# Patient Record
Sex: MALE | Race: WHITE | Employment: OTHER | URBAN - METROPOLITAN AREA
[De-identification: names, ages, dates, MRNs, and addresses within clinical notes are randomized per-mention and may not be internally consistent; named-entity substitution may affect disease eponyms.]

---

## 2017-11-01 ENCOUNTER — HOSPITAL ENCOUNTER (OUTPATIENT)
Dept: OTHER | Age: 77
Discharge: HOME | End: 2017-11-30
Attending: INTERNAL MEDICINE | Admitting: INTERNAL MEDICINE

## 2017-12-01 ENCOUNTER — HOSPITAL ENCOUNTER (OUTPATIENT)
Dept: OTHER | Age: 77
Discharge: HOME | End: 2017-12-31
Attending: INTERNAL MEDICINE | Admitting: INTERNAL MEDICINE

## 2018-01-01 ENCOUNTER — HOSPITAL ENCOUNTER (OUTPATIENT)
Dept: OTHER | Age: 78
Discharge: HOME | End: 2018-01-31
Attending: INTERNAL MEDICINE | Admitting: INTERNAL MEDICINE

## 2018-02-01 ENCOUNTER — HOSPITAL ENCOUNTER (OUTPATIENT)
Dept: OTHER | Age: 78
Discharge: HOME | End: 2018-02-28
Attending: INTERNAL MEDICINE | Admitting: INTERNAL MEDICINE

## 2018-03-01 ENCOUNTER — HOSPITAL ENCOUNTER (OUTPATIENT)
Dept: OTHER | Age: 78
Discharge: HOME | End: 2018-03-31
Attending: INTERNAL MEDICINE | Admitting: INTERNAL MEDICINE

## 2018-04-01 ENCOUNTER — HOSPITAL ENCOUNTER (OUTPATIENT)
Dept: OTHER | Age: 78
Discharge: HOME | End: 2018-04-30
Attending: INTERNAL MEDICINE | Admitting: INTERNAL MEDICINE

## 2018-05-01 ENCOUNTER — HOSPITAL ENCOUNTER (OUTPATIENT)
Dept: OTHER | Age: 78
Discharge: HOME | End: 2018-05-31
Attending: INTERNAL MEDICINE | Admitting: INTERNAL MEDICINE

## 2021-10-26 ENCOUNTER — HOSPITAL ENCOUNTER (OUTPATIENT)
Age: 81
Setting detail: OBSERVATION
Discharge: HOME | End: 2021-10-28
Attending: EMERGENCY MEDICINE | Admitting: INTERNAL MEDICINE
Payer: COMMERCIAL

## 2021-10-26 DIAGNOSIS — R26.2 AMBULATORY DYSFUNCTION: Primary | ICD-10-CM

## 2021-10-26 DIAGNOSIS — D72.829 LEUKOCYTOSIS, UNSPECIFIED TYPE: ICD-10-CM

## 2021-10-26 DIAGNOSIS — M25.561 ACUTE PAIN OF BOTH KNEES: ICD-10-CM

## 2021-10-26 DIAGNOSIS — M25.562 ACUTE PAIN OF BOTH KNEES: ICD-10-CM

## 2021-10-26 DIAGNOSIS — R53.1 GENERALIZED WEAKNESS: ICD-10-CM

## 2021-10-26 DIAGNOSIS — L89.302 PRESSURE INJURY OF BUTTOCK, STAGE 2, UNSPECIFIED LATERALITY (HCC): ICD-10-CM

## 2021-10-26 PROCEDURE — 96367 TX/PROPH/DG ADDL SEQ IV INF: CPT

## 2021-10-26 PROCEDURE — P9612 CATHETERIZE FOR URINE SPEC: HCPCS

## 2021-10-26 PROCEDURE — 96365 THER/PROPH/DIAG IV INF INIT: CPT

## 2021-10-26 PROCEDURE — 99285 EMERGENCY DEPT VISIT HI MDM: CPT

## 2021-10-26 RX ORDER — OXYCODONE HYDROCHLORIDE AND ACETAMINOPHEN 5; 325 MG/1; MG/1
1 TABLET ORAL ONCE
Status: COMPLETED | OUTPATIENT
Start: 2021-10-27 | End: 2021-10-27

## 2021-10-26 RX ORDER — METHYLPREDNISOLONE 4 MG/1
TABLET ORAL
COMMUNITY
Start: 2021-10-22 | End: 2021-10-26

## 2021-10-26 RX ORDER — METHYLPREDNISOLONE 4 MG/1
TABLET ORAL
Status: ON HOLD | COMMUNITY
Start: 2021-03-11 | End: 2021-10-28 | Stop reason: HOSPADM

## 2021-10-26 RX ORDER — OFLOXACIN 3 MG/ML
SOLUTION/ DROPS OPHTHALMIC
Status: ON HOLD | COMMUNITY
Start: 2021-08-11 | End: 2021-10-27

## 2021-10-26 RX ORDER — PREDNISOLONE ACETATE 10 MG/ML
SUSPENSION/ DROPS OPHTHALMIC
Status: ON HOLD | COMMUNITY
Start: 2021-09-07 | End: 2021-10-27

## 2021-10-26 RX ORDER — FUROSEMIDE 40 MG/1
40 TABLET ORAL DAILY
Status: ON HOLD | COMMUNITY
Start: 2021-01-06 | End: 2021-10-28 | Stop reason: SDUPTHER

## 2021-10-26 ASSESSMENT — PAIN DESCRIPTION - ORIENTATION: ORIENTATION: RIGHT;LEFT

## 2021-10-26 ASSESSMENT — PAIN SCALES - GENERAL: PAINLEVEL_OUTOF10: 3

## 2021-10-26 ASSESSMENT — PAIN DESCRIPTION - PAIN TYPE: TYPE: ACUTE PAIN

## 2021-10-26 ASSESSMENT — PAIN DESCRIPTION - FREQUENCY: FREQUENCY: CONTINUOUS

## 2021-10-26 ASSESSMENT — PAIN DESCRIPTION - DESCRIPTORS: DESCRIPTORS: ACHING;SHARP

## 2021-10-26 ASSESSMENT — PAIN DESCRIPTION - LOCATION: LOCATION: FOOT

## 2021-10-27 ENCOUNTER — APPOINTMENT (OUTPATIENT)
Dept: GENERAL RADIOLOGY | Age: 81
End: 2021-10-27
Payer: COMMERCIAL

## 2021-10-27 ENCOUNTER — APPOINTMENT (OUTPATIENT)
Dept: CT IMAGING | Age: 81
End: 2021-10-27
Payer: COMMERCIAL

## 2021-10-27 PROBLEM — R26.2 INABILITY TO AMBULATE DUE TO KNEE: Status: ACTIVE | Noted: 2021-10-27

## 2021-10-27 LAB
A/G RATIO: 1 (ref 1.1–2.2)
ALBUMIN SERPL-MCNC: 3.8 G/DL (ref 3.4–5)
ALP BLD-CCNC: 84 U/L (ref 40–129)
ALT SERPL-CCNC: 18 U/L (ref 10–40)
AMORPHOUS: ABNORMAL /HPF
ANION GAP SERPL CALCULATED.3IONS-SCNC: 12 MMOL/L (ref 3–16)
AST SERPL-CCNC: 47 U/L (ref 15–37)
BACTERIA: ABNORMAL /HPF
BASOPHILS ABSOLUTE: 0 K/UL (ref 0–0.2)
BASOPHILS RELATIVE PERCENT: 0.1 %
BILIRUB SERPL-MCNC: 1.6 MG/DL (ref 0–1)
BILIRUBIN URINE: ABNORMAL
BLOOD, URINE: ABNORMAL
BUN BLDV-MCNC: 20 MG/DL (ref 7–20)
CALCIUM SERPL-MCNC: 9.6 MG/DL (ref 8.3–10.6)
CHLORIDE BLD-SCNC: 99 MMOL/L (ref 99–110)
CLARITY: CLEAR
CO2: 24 MMOL/L (ref 21–32)
COLOR: YELLOW
CREAT SERPL-MCNC: 0.8 MG/DL (ref 0.8–1.3)
EKG ATRIAL RATE: 86 BPM
EKG DIAGNOSIS: NORMAL
EKG P AXIS: 79 DEGREES
EKG P-R INTERVAL: 178 MS
EKG Q-T INTERVAL: 380 MS
EKG QRS DURATION: 104 MS
EKG QTC CALCULATION (BAZETT): 454 MS
EKG R AXIS: 45 DEGREES
EKG T AXIS: 71 DEGREES
EKG VENTRICULAR RATE: 86 BPM
EOSINOPHILS ABSOLUTE: 0 K/UL (ref 0–0.6)
EOSINOPHILS RELATIVE PERCENT: 0.1 %
GFR AFRICAN AMERICAN: >60
GFR NON-AFRICAN AMERICAN: >60
GLOBULIN: 3.7 G/DL
GLUCOSE BLD-MCNC: 200 MG/DL (ref 70–99)
GLUCOSE URINE: NEGATIVE MG/DL
HCT VFR BLD CALC: 39.5 % (ref 40.5–52.5)
HEMOGLOBIN: 13.3 G/DL (ref 13.5–17.5)
KETONES, URINE: ABNORMAL MG/DL
LEUKOCYTE ESTERASE, URINE: NEGATIVE
LYMPHOCYTES ABSOLUTE: 0.3 K/UL (ref 1–5.1)
LYMPHOCYTES RELATIVE PERCENT: 1.9 %
MCH RBC QN AUTO: 34.4 PG (ref 26–34)
MCHC RBC AUTO-ENTMCNC: 33.6 G/DL (ref 31–36)
MCV RBC AUTO: 102.3 FL (ref 80–100)
MICROSCOPIC EXAMINATION: YES
MONOCYTES ABSOLUTE: 1.1 K/UL (ref 0–1.3)
MONOCYTES RELATIVE PERCENT: 6.7 %
NEUTROPHILS ABSOLUTE: 15.2 K/UL (ref 1.7–7.7)
NEUTROPHILS RELATIVE PERCENT: 91.2 %
NITRITE, URINE: NEGATIVE
PDW BLD-RTO: 13.8 % (ref 12.4–15.4)
PH UA: 6 (ref 5–8)
PLATELET # BLD: 159 K/UL (ref 135–450)
PMV BLD AUTO: 8.2 FL (ref 5–10.5)
POTASSIUM REFLEX MAGNESIUM: 4.2 MMOL/L (ref 3.5–5.1)
PRO-BNP: 6104 PG/ML (ref 0–449)
PROCALCITONIN: 0.86 NG/ML (ref 0–0.15)
PROTEIN UA: 100 MG/DL
RBC # BLD: 3.86 M/UL (ref 4.2–5.9)
RBC UA: ABNORMAL /HPF (ref 0–4)
SARS-COV-2, NAAT: NOT DETECTED
SEDIMENTATION RATE, ERYTHROCYTE: 95 MM/HR (ref 0–20)
SODIUM BLD-SCNC: 135 MMOL/L (ref 136–145)
SPECIFIC GRAVITY UA: 1.02 (ref 1–1.03)
TOTAL PROTEIN: 7.5 G/DL (ref 6.4–8.2)
URIC ACID, SERUM: 8.3 MG/DL (ref 3.5–7.2)
URINE REFLEX TO CULTURE: ABNORMAL
URINE TYPE: ABNORMAL
UROBILINOGEN, URINE: 2 E.U./DL
WBC # BLD: 16.7 K/UL (ref 4–11)
WBC UA: ABNORMAL /HPF (ref 0–5)

## 2021-10-27 PROCEDURE — 84550 ASSAY OF BLOOD/URIC ACID: CPT

## 2021-10-27 PROCEDURE — 99221 1ST HOSP IP/OBS SF/LOW 40: CPT | Performed by: NURSE PRACTITIONER

## 2021-10-27 PROCEDURE — 73110 X-RAY EXAM OF WRIST: CPT

## 2021-10-27 PROCEDURE — 73502 X-RAY EXAM HIP UNI 2-3 VIEWS: CPT

## 2021-10-27 PROCEDURE — 73560 X-RAY EXAM OF KNEE 1 OR 2: CPT

## 2021-10-27 PROCEDURE — 2580000003 HC RX 258: Performed by: INTERNAL MEDICINE

## 2021-10-27 PROCEDURE — 1200000000 HC SEMI PRIVATE

## 2021-10-27 PROCEDURE — 71045 X-RAY EXAM CHEST 1 VIEW: CPT

## 2021-10-27 PROCEDURE — 70450 CT HEAD/BRAIN W/O DYE: CPT

## 2021-10-27 PROCEDURE — 6370000000 HC RX 637 (ALT 250 FOR IP): Performed by: INTERNAL MEDICINE

## 2021-10-27 PROCEDURE — 87635 SARS-COV-2 COVID-19 AMP PRB: CPT

## 2021-10-27 PROCEDURE — 96367 TX/PROPH/DG ADDL SEQ IV INF: CPT

## 2021-10-27 PROCEDURE — 87040 BLOOD CULTURE FOR BACTERIA: CPT

## 2021-10-27 PROCEDURE — 81001 URINALYSIS AUTO W/SCOPE: CPT

## 2021-10-27 PROCEDURE — 73080 X-RAY EXAM OF ELBOW: CPT

## 2021-10-27 PROCEDURE — 73630 X-RAY EXAM OF FOOT: CPT

## 2021-10-27 PROCEDURE — 93005 ELECTROCARDIOGRAM TRACING: CPT | Performed by: EMERGENCY MEDICINE

## 2021-10-27 PROCEDURE — 85652 RBC SED RATE AUTOMATED: CPT

## 2021-10-27 PROCEDURE — G0378 HOSPITAL OBSERVATION PER HR: HCPCS

## 2021-10-27 PROCEDURE — 6360000002 HC RX W HCPCS: Performed by: INTERNAL MEDICINE

## 2021-10-27 PROCEDURE — 72131 CT LUMBAR SPINE W/O DYE: CPT

## 2021-10-27 PROCEDURE — 83880 ASSAY OF NATRIURETIC PEPTIDE: CPT

## 2021-10-27 PROCEDURE — 6370000000 HC RX 637 (ALT 250 FOR IP): Performed by: EMERGENCY MEDICINE

## 2021-10-27 PROCEDURE — 93010 ELECTROCARDIOGRAM REPORT: CPT | Performed by: INTERNAL MEDICINE

## 2021-10-27 PROCEDURE — 73610 X-RAY EXAM OF ANKLE: CPT

## 2021-10-27 PROCEDURE — 36415 COLL VENOUS BLD VENIPUNCTURE: CPT

## 2021-10-27 PROCEDURE — 80053 COMPREHEN METABOLIC PANEL: CPT

## 2021-10-27 PROCEDURE — 84145 PROCALCITONIN (PCT): CPT

## 2021-10-27 PROCEDURE — 85025 COMPLETE CBC W/AUTO DIFF WBC: CPT

## 2021-10-27 PROCEDURE — 6370000000 HC RX 637 (ALT 250 FOR IP): Performed by: NURSE PRACTITIONER

## 2021-10-27 PROCEDURE — 96365 THER/PROPH/DIAG IV INF INIT: CPT

## 2021-10-27 RX ORDER — ACETAMINOPHEN 325 MG/1
650 TABLET ORAL EVERY 6 HOURS PRN
Status: DISCONTINUED | OUTPATIENT
Start: 2021-10-27 | End: 2021-10-28 | Stop reason: HOSPADM

## 2021-10-27 RX ORDER — OXYCODONE HYDROCHLORIDE AND ACETAMINOPHEN 5; 325 MG/1; MG/1
1 TABLET ORAL EVERY 6 HOURS PRN
Status: DISCONTINUED | OUTPATIENT
Start: 2021-10-27 | End: 2021-10-28

## 2021-10-27 RX ORDER — FUROSEMIDE 40 MG/1
40 TABLET ORAL DAILY
Status: DISCONTINUED | OUTPATIENT
Start: 2021-10-27 | End: 2021-10-28 | Stop reason: HOSPADM

## 2021-10-27 RX ORDER — COLCHICINE 0.6 MG/1
0.6 CAPSULE ORAL 2 TIMES DAILY
Status: DISCONTINUED | OUTPATIENT
Start: 2021-10-27 | End: 2021-10-28 | Stop reason: HOSPADM

## 2021-10-27 RX ORDER — MAGNESIUM SULFATE IN WATER 40 MG/ML
2000 INJECTION, SOLUTION INTRAVENOUS PRN
Status: DISCONTINUED | OUTPATIENT
Start: 2021-10-27 | End: 2021-10-28 | Stop reason: HOSPADM

## 2021-10-27 RX ORDER — POLYETHYLENE GLYCOL 3350 17 G/17G
17 POWDER, FOR SOLUTION ORAL DAILY PRN
Status: DISCONTINUED | OUTPATIENT
Start: 2021-10-27 | End: 2021-10-28 | Stop reason: HOSPADM

## 2021-10-27 RX ORDER — SODIUM CHLORIDE 9 MG/ML
25 INJECTION, SOLUTION INTRAVENOUS PRN
Status: DISCONTINUED | OUTPATIENT
Start: 2021-10-27 | End: 2021-10-28 | Stop reason: HOSPADM

## 2021-10-27 RX ORDER — SODIUM CHLORIDE 9 MG/ML
INJECTION, SOLUTION INTRAVENOUS CONTINUOUS
Status: DISCONTINUED | OUTPATIENT
Start: 2021-10-27 | End: 2021-10-27

## 2021-10-27 RX ORDER — ACETAMINOPHEN 650 MG/1
650 SUPPOSITORY RECTAL EVERY 6 HOURS PRN
Status: DISCONTINUED | OUTPATIENT
Start: 2021-10-27 | End: 2021-10-28 | Stop reason: HOSPADM

## 2021-10-27 RX ORDER — ONDANSETRON 4 MG/1
4 TABLET, ORALLY DISINTEGRATING ORAL EVERY 8 HOURS PRN
Status: DISCONTINUED | OUTPATIENT
Start: 2021-10-27 | End: 2021-10-28 | Stop reason: HOSPADM

## 2021-10-27 RX ORDER — POTASSIUM CHLORIDE 20 MEQ/1
40 TABLET, EXTENDED RELEASE ORAL PRN
Status: DISCONTINUED | OUTPATIENT
Start: 2021-10-27 | End: 2021-10-28 | Stop reason: HOSPADM

## 2021-10-27 RX ORDER — POTASSIUM CHLORIDE 7.45 MG/ML
10 INJECTION INTRAVENOUS PRN
Status: DISCONTINUED | OUTPATIENT
Start: 2021-10-27 | End: 2021-10-28 | Stop reason: HOSPADM

## 2021-10-27 RX ORDER — ONDANSETRON 2 MG/ML
4 INJECTION INTRAMUSCULAR; INTRAVENOUS EVERY 6 HOURS PRN
Status: DISCONTINUED | OUTPATIENT
Start: 2021-10-27 | End: 2021-10-28 | Stop reason: HOSPADM

## 2021-10-27 RX ORDER — SODIUM CHLORIDE 0.9 % (FLUSH) 0.9 %
5-40 SYRINGE (ML) INJECTION PRN
Status: DISCONTINUED | OUTPATIENT
Start: 2021-10-27 | End: 2021-10-28 | Stop reason: HOSPADM

## 2021-10-27 RX ORDER — SODIUM CHLORIDE 0.9 % (FLUSH) 0.9 %
5-40 SYRINGE (ML) INJECTION EVERY 12 HOURS SCHEDULED
Status: DISCONTINUED | OUTPATIENT
Start: 2021-10-27 | End: 2021-10-28 | Stop reason: HOSPADM

## 2021-10-27 RX ORDER — CASTOR OIL AND BALSAM, PERU 788; 87 MG/G; MG/G
OINTMENT TOPICAL 2 TIMES DAILY
Status: DISCONTINUED | OUTPATIENT
Start: 2021-10-27 | End: 2021-10-28 | Stop reason: HOSPADM

## 2021-10-27 RX ADMIN — FUROSEMIDE 40 MG: 40 TABLET ORAL at 07:26

## 2021-10-27 RX ADMIN — DEXTROSE MONOHYDRATE 500 MG: 50 INJECTION, SOLUTION INTRAVENOUS at 05:46

## 2021-10-27 RX ADMIN — SODIUM CHLORIDE, PRESERVATIVE FREE 10 ML: 5 INJECTION INTRAVENOUS at 20:32

## 2021-10-27 RX ADMIN — POLYETHYLENE GLYCOL (3350) 17 G: 17 POWDER, FOR SOLUTION ORAL at 20:31

## 2021-10-27 RX ADMIN — Medication: at 20:32

## 2021-10-27 RX ADMIN — CEFTRIAXONE SODIUM 1000 MG: 1 INJECTION, POWDER, FOR SOLUTION INTRAMUSCULAR; INTRAVENOUS at 05:08

## 2021-10-27 RX ADMIN — COLCHICINE 0.6 MG: 0.6 CAPSULE ORAL at 18:44

## 2021-10-27 RX ADMIN — OXYCODONE HYDROCHLORIDE AND ACETAMINOPHEN 1 TABLET: 5; 325 TABLET ORAL at 20:31

## 2021-10-27 RX ADMIN — SODIUM CHLORIDE: 9 INJECTION, SOLUTION INTRAVENOUS at 05:06

## 2021-10-27 RX ADMIN — SODIUM CHLORIDE, PRESERVATIVE FREE 10 ML: 5 INJECTION INTRAVENOUS at 07:28

## 2021-10-27 RX ADMIN — OXYCODONE HYDROCHLORIDE AND ACETAMINOPHEN 1 TABLET: 5; 325 TABLET ORAL at 00:48

## 2021-10-27 ASSESSMENT — PAIN SCALES - GENERAL
PAINLEVEL_OUTOF10: 0
PAINLEVEL_OUTOF10: 4
PAINLEVEL_OUTOF10: 2
PAINLEVEL_OUTOF10: 6
PAINLEVEL_OUTOF10: 4
PAINLEVEL_OUTOF10: 4
PAINLEVEL_OUTOF10: 0

## 2021-10-27 NOTE — PROGRESS NOTES
4 Eyes Skin Assessment     The patient is being assess for   Admission    I agree that 2 RN's have performed a thorough Head to Toe Skin Assessment on the patient. ALL assessment sites listed below have been assessed. Areas assessed for pressure by both nurses:   [x]   Head, Face, and Ears   [x]   Shoulders, Back, and Chest, Abdomen  [x]   Arms, Elbows, and Hands   [x]   Coccyx, Sacrum, and Ischium  [x]   Legs, Feet, and Heels        · Scabbing to head  · DTI to buttocks   · Bruising R elbow area  · Scattered bruises  · SMALL Pimple under L breast    Skin Assessed Under all Medical Devices by both nurses:  NA              All Mepilex Borders were peeled back and area peeked at by both nurses:  No: NO  Please list where Mepilex Borders are located: NA             **SHARE this note so that the co-signing nurse is able to place an eSignature**    Co-signer eSignature: Electronically signed by Dioni Anderson RN on 10/27/21 at 4:22 PM EDT    Does the Patient have Skin Breakdown related to pressure?   Yes [facility] WOUND CARE was Initiated documentation include the Sivan-wound, Wound Assessment, Measurements, Dressing Treatment, Drainage, and Color\",              Ton Prevention initiated:  Yes   Wound Care Orders initiated:  Yes      WOC nurse consulted for Pressure Injury (Stage 3,4, Unstageable, DTI, NWPT, Complex wounds)and New or Established Ostomies:  Yes    [address] RN     Primary Nurse eSignature: Electronically signed by Army Kisha RN on 10/27/21 at 6:34 PM EDT

## 2021-10-27 NOTE — ED PROVIDER NOTES
[address] ED      CHIEF COMPLAINT  Knee Pain (Pt states he had a gout flair up and fell friday; c/o pain bilateral knee to ankle pain. 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 male with a past medical history of CHF and gout who presents to the ED complaining of pain secondary to a fall. The patient states that he fell this past Friday. He states that he was attempting to take his pants off, when he tripped on his pants and fell onto his right side. He denies hitting his head or losing consciousness and he is not on blood thinners. He states that he was able to walk initially on Saturday, however had worsening pain over the past 3 days and has been sitting in a chair and unable to walk since Sunday. He states that he has pain primarily to his right arm, as well as both of his knees. He denies any chest pain or abdominal pain. Denies any vomiting or shortness of breath. Denies any dizziness but states he is unable to walk due to pain. He takes prednisone for gout and took 10 mg of prednisone at home he also took ibuprofen earlier today. Collateral history from the patient's daughter is that he apparently has not really been eating or drinking the past several days. He has not been walking due to pain, and has been intermittently confused. He also has not taken his Lasix for the past 5 days. Apparently the patient had a \"fever\" for EMS. No other complaints, modifying factors or associated symptoms. I have reviewed the following from the nursing documentation. History reviewed. No pertinent past medical history. History reviewed. No pertinent surgical history. History reviewed. No pertinent family history.   Social History     Socioeconomic History    Marital status:      Spouse name: Not on file    Number of children: Not on file    Years of education: Not on file    Highest education level: Not on file   Occupational History    Not on file   Tobacco Use    Smoking status: Never Smoker    Smokeless tobacco: Never Used   Substance and Sexual Activity    Alcohol use: Never    Drug use: Never    Sexual activity: Not on file   Other Topics Concern    Not on file   Social History Narrative    Not on file     Social Determinants of Health     Financial Resource Strain:     Difficulty of Paying Living Expenses:    Food Insecurity:     Worried About Running Out of Food in the Last Year:     [address] in the Last Year:    Transportation Needs:     Lack of Transportation (Medical):      Lack of Transportation (Non-Medical):    Physical Activity:     Days of Exercise per Week:     Minutes of Exercise per Session:    Stress:     Feeling of Stress :    Social Connections:     Frequency of Communication with Friends and Family:     Frequency of Social Gatherings with Friends and Family:     Attends Hoah[employer] Services:     Active Member of Clubs or Organizations:     Attends Club or Organization Meetings:     Marital Status:    Intimate Partner Violence:     Fear of Current or Ex-Partner:     Emotionally Abused:     Physically Abused:     Sexually Abused:      Current Facility-Administered Medications   Medication Dose Route Frequency Provider Last Rate Last Admin    cefTRIAXone (ROCEPHIN) 1000 mg IVPB in 50 mL D5W minibag  1,000 mg IntraVENous Q24H Rishabh Mares MD        azithromycin (ZITHROMAX) 500 mg in D5W 250ml addavial  500 mg IntraVENous Q24H Rishabh Mares MD        furosemide (LASIX) tablet 40 mg  40 mg Oral Daily Risahbh Mares MD        0.9 % sodium chloride infusion   IntraVENous Continuous Rishabh Mares MD        sodium chloride flush 0.9 % injection 5-40 mL  5-40 mL IntraVENous 2 times per day Rishabh Mares MD        sodium chloride flush 0.9 % injection 5-40 mL  5-40 mL IntraVENous PRN Rishabh Mares MD        0.9 % sodium chloride infusion  25 mL IntraVENous PRN Rishabh Mares MD  enoxaparin (LOVENOX) injection 40 mg  40 mg SubCUTAneous Daily Maynor Ramsey MD        ondansetron (ZOFRAN-ODT) disintegrating tablet 4 mg  4 mg Oral Q8H PRN Maynor Ramsey MD        Or    [facility]ron [facility]) injection 4 mg  4 mg IntraVENous Q6H PRN Maynor Ramsey MD        polyethylene glycol [facility]) packet 17 g  17 g Oral Daily PRN Maynor Ramsey MD        acetaminophen (TYLENOL) tablet 650 mg  650 mg Oral Q6H PRN Maynor Ramsey MD        Or    acetaminophen (TYLENOL) suppository 650 mg  650 mg Rectal Q6H PRN Maynor Ramsey MD        potassium chloride (KLOR-CON M) extended release tablet 40 mEq  40 mEq Oral PRN Maynor Ramsey MD        Or    potassium bicarb-citric acid (EFFER-K) effervescent tablet 40 mEq  40 mEq Oral PRN Maynor Ramsey MD        Or    potassium chloride 10 mEq/100 mL IVPB (Peripheral Line)  10 mEq IntraVENous PRN Maynor Ramsey MD        magnesium sulfate 2000 mg in 50 mL IVPB premix  2,000 mg IntraVENous PRN Maynor Ramsey MD         Current Outpatient Medications   Medication Sig Dispense Refill    furosemide (LASIX) 40 MG tablet Take 40 mg by mouth daily      methylPREDNISolone (MEDROL DOSEPACK) 4 MG tablet follow package directions      ofloxacin (OCUFLOX) 0.3 % solution       prednisoLONE acetate (PRED FORTE) 1 % ophthalmic suspension        Allergies   Allergen Reactions    Allopurinol Anaphylaxis, Itching and Rash    Prednisone Anxiety     Became \"wired\" per pt       REVIEW OF SYSTEMS  10 systems reviewed, pertinent positives per HPI otherwise noted to be negative. PHYSICAL EXAM  /65   Pulse 86   Temp 98 °F (36.7 °C) (Oral)   Resp 18   Ht 5' 10\" (1.778 m)   Wt 180 lb (81.6 kg)   SpO2 99%   BMI 25.83 kg/m²    Physical exam:  General appearance: awake and cooperative. no distress. Non toxic appearing. Skin: Warm and dry. No rashes or lesions. HENT: Normocephalic. Atraumatic. Negative villegas's sign. No raccoon eyes. No nasal septal hematoma. No oropharyngeal bleeding. Mucus membranes are moist. Midface stable. Neck: supple. No C spine tenderness midline. Eyes: YI. EOM intact. Heart: RRR. No murmurs. Lungs: Respirations unlabored. CTAB. No wheezes, rales, or rhonchi. Good air exchange  Abdomen: No tenderness. Soft. Non distended. No peritoneal signs. Musculoskeletal: there is ecchymosis to right lumbar spin and tenderness to palpation midline L spine; no step offs no T spine tenderness midline. No step offs. Right shoulder normal ROM and atraumatic. Right elbow with ecchymosis; flexion/extension intact; ecchymosis to radial aspect right wrist no anatomical snuffbox tenderness; no deformity; median, radial, and ulnar nerves intact motor and sensory. cardinal motions of hand intact. MCP, PIP, and DIP intact with flexion and extension 5/5 strength. Cap refill <2 seconds. Radial and ulnar pulses +2/4 bilaterally. tenderness to palpation right hip without deformity; quadriceps tendons intact; contusions to bilateral knees with flexion/extension intact but limited ROM due to pain; tenderness to lateral aspect right ankle;  decreased ROM bilateral ankles due to pain; achilles tendon intact; no tenderness to base of 5th metatarsal; cap refill <2 seconds; toe flexion/extension 5/5 strength; toe flexion and extension intact all toes; no tenderness or ecchymosis to plantar aspect of foot; DP and PT +2/4   Neurological: Alert and oriented. No focal deficits. Sensation intact x 4. No aphasia or dysarthria. Psychiatric: Normal mood and affect. LABS  I have reviewed all labs for this visit.    Results for orders placed or performed during the hospital encounter of 10/26/21   CBC Auto Differential   Result Value Ref Range    WBC 16.7 (H) 4.0 - 11.0 K/uL    RBC 3.86 (L) 4.20 - 5.90 M/uL    Hemoglobin 13.3 (L) 13.5 - 17.5 g/dL    Hematocrit 39.5 (L) 40.5 - 52.5 %    .3 (H) 80.0 - 100.0 fL    MCH 34.4 (H) 26.0 - 34.0 pg    MCHC 33.6 31.0 - 36.0 g/dL    RDW 13.8 12.4 - 15.4 %    Platelets 422 590 - 491 K/uL    MPV 8.2 5.0 - 10.5 fL    Neutrophils % 91.2 %    Lymphocytes % 1.9 %    Monocytes % 6.7 %    Eosinophils % 0.1 %    Basophils % 0.1 %    Neutrophils Absolute 15.2 (H) 1.7 - 7.7 K/uL    Lymphocytes Absolute 0.3 (L) 1.0 - 5.1 K/uL    Monocytes Absolute 1.1 0.0 - 1.3 K/uL    Eosinophils Absolute 0.0 0.0 - 0.6 K/uL    Basophils Absolute 0.0 0.0 - 0.2 K/uL   Comprehensive Metabolic Panel w/ Reflex to MG   Result Value Ref Range    Sodium 135 (L) 136 - 145 mmol/L    Potassium reflex Magnesium 4.2 3.5 - 5.1 mmol/L    Chloride 99 99 - 110 mmol/L    CO2 24 21 - 32 mmol/L    Anion Gap 12 3 - 16    Glucose 200 (H) 70 - 99 mg/dL    BUN 20 7 - 20 mg/dL    CREATININE 0.8 0.8 - 1.3 mg/dL    GFR Non-African American >60 >60    GFR African American >60 >60    Calcium 9.6 8.3 - 10.6 mg/dL    Total Protein 7.5 6.4 - 8.2 g/dL    Albumin 3.8 3.4 - 5.0 g/dL    Albumin/Globulin Ratio 1.0 (L) 1.1 - 2.2    Total Bilirubin 1.6 (H) 0.0 - 1.0 mg/dL    Alkaline Phosphatase 84 40 - 129 U/L    ALT 18 10 - 40 U/L    AST 47 (H) 15 - 37 U/L    Globulin 3.7 Not Established g/dL   Urinalysis Reflex to Culture    Specimen: Urine, clean catch   Result Value Ref Range    Color, UA Yellow Straw/Yellow    Clarity, UA Clear Clear    Glucose, Ur Negative Negative mg/dL    Bilirubin Urine SMALL (A) Negative    Ketones, Urine TRACE (A) Negative mg/dL    Specific Gravity, UA 1.025 1.005 - 1.030    Blood, Urine LARGE (A) Negative    pH, UA 6.0 5.0 - 8.0    Protein,  (A) Negative mg/dL    Urobilinogen, Urine 2.0 (A) <2.0 E.U./dL    Nitrite, Urine Negative Negative    Leukocyte Esterase, Urine Negative Negative    Microscopic Examination YES     Urine Type NotGiven     Urine Reflex to Culture Not Indicated    Brain Natriuretic Peptide   Result Value Ref Range    Pro-BNP 6,104 (H) 0 - 449 pg/mL   Microscopic Urinalysis   Result Value Ref Range    WBC, UA 0-2 0 - 5 /HPF    RBC, UA 0-2 0 - 4 /HPF    Bacteria, UA 1+ (A) None Seen /HPF    Amorphous, UA 1+ /HPF   Uric acid   Result Value Ref Range    Uric Acid, Serum 8.3 (H) 3.5 - 7.2 mg/dL   Procalcitonin   Result Value Ref Range    Procalcitonin 0.86 (H) 0.00 - 0.15 ng/mL       ECG  The Ekg interpreted by me shows  normal sinus rhythm with a rate of 86 with PACs  Axis is   Normal  QTc is  within an acceptable range  Intervals and Durations are unremarkable. ST Segments: nonspecific ST abnormality no acute ischemia   No prior EKG for comparison      RADIOLOGY  XR ELBOW RIGHT (MIN 3 VIEWS)    Result Date: 10/27/2021  EXAMINATION: THREE XRAY VIEWS OF THE RIGHT ELBOW 10/27/2021 12:12 am COMPARISON: None. HISTORY: ORDERING SYSTEM PROVIDED HISTORY: fall with bruising TECHNOLOGIST PROVIDED HISTORY: Reason for exam:->fall with bruising Reason for Exam: fell last week, bruising Acuity: Acute Type of Exam: Initial FINDINGS: Nonstandard views were submitted. A tiny density is seen adjacent to the radial head on 1 of the views. Joint space alignment is grossly normal. Joint effusion cannot be assessed. There is posterior soft tissue swelling. Tiny density adjacent to the radial head is indeterminate. This would be an unusual appearance for a fracture and is probably degenerative. Recommend repeat imaging once the patient is able to properly position his arm for routine views. XR WRIST RIGHT (MIN 3 VIEWS)    Result Date: 10/27/2021  EXAMINATION: 3 XRAY VIEWS OF THE RIGHT WRIST 10/27/2021 12:13 am COMPARISON: None. HISTORY: ORDERING SYSTEM PROVIDED HISTORY: bruising radial aspect TECHNOLOGIST PROVIDED HISTORY: Reason for exam:->bruising radial aspect Reason for Exam: fell last week, bruising Acuity: Acute Type of Exam: Initial FINDINGS: No acute fracture is identified. There is a remote ununited fracture of the ulnar styloid with adjacent osteopenia in the distal ulna.   Joint space alignment is normal.  There is posterior soft tissue swelling. Atherosclerotic calcifications are identified. No acute fracture or malalignment. XR KNEE LEFT (1-2 VIEWS)    Result Date: 10/27/2021  EXAMINATION: 2 XRAY VIEWS OF THE LEFT KNEE 10/27/2021 12:13 am COMPARISON: None. HISTORY: ORDERING SYSTEM PROVIDED HISTORY: fall TECHNOLOGIST PROVIDED HISTORY: Reason for exam:->fall Reason for Exam: fell last week, bruising Acuity: Acute Type of Exam: Initial Pain FINDINGS: There is no fracture or malalignment. Chondrocalcinosis is noted. There is tricompartmental spurring. There is no definite joint effusion though both attempts at the lateral view are limited by the degree of flexion and rotation. Atherosclerotic calcifications are identified. No definite fracture or malalignment. XR KNEE RIGHT (1-2 VIEWS)    Result Date: 10/27/2021  EXAMINATION: 2 XRAY VIEWS OF THE RIGHT KNEE 10/27/2021 12:13 am COMPARISON: None. HISTORY: ORDERING SYSTEM PROVIDED HISTORY: fall TECHNOLOGIST PROVIDED HISTORY: Reason for exam:->fall Reason for Exam: fell last week, bruising Acuity: Acute Type of Exam: Initial Knee pain FINDINGS: There is no fracture or malalignment. Chondrocalcinosis is identified. Knee joint effusion is identified. Atherosclerotic calcifications are noted. No fracture or malalignment. XR ANKLE RIGHT (MIN 3 VIEWS)    Result Date: 10/27/2021  EXAMINATION: THREE XRAY VIEWS OF THE RIGHT ANKLE 10/27/2021 12:12 am COMPARISON: None. HISTORY: ORDERING SYSTEM PROVIDED HISTORY: pain post fall TECHNOLOGIST PROVIDED HISTORY: Reason for exam:->pain post fall Reason for Exam: fell last week, bruising Acuity: Acute Type of Exam: Initial FINDINGS: There is no fracture or malalignment. Achilles tendon enthesophyte and plantar calcaneal spur are noted. There is diffuse soft tissue swelling, especially laterally. Atherosclerotic calcifications are noted. No fracture or malalignment.      XR FOOT LEFT (MIN 3 VIEWS)    Result Date: 10/27/2021  EXAMINATION: THREE XRAY VIEWS OF THE LEFT FOOT 10/27/2021 12:13 am COMPARISON: None. HISTORY: ORDERING SYSTEM PROVIDED HISTORY: fall with pain TECHNOLOGIST PROVIDED HISTORY: Reason for exam:->fall with pain Reason for Exam: fell last week, bruising Acuity: Acute Type of Exam: Initial FINDINGS: Bony density is seen dorsal to the anterior corner of the talus. It is unclear if this represents an avulsion fracture or spurring as there is spurring throughout the intertarsal joints. There is no other evidence to suggest fracture. Joint space alignment is normal.  There is a large plantar calcaneal spur. Achilles tendon enthesophyte is also present. An os peroneum is noted. There is diffuse soft tissue swelling. Atherosclerotic calcifications are noted. The bony density along the anterior talus could signify a mid tarsal injury or may be a degenerative spur. There is no other evidence for fracture. CT Head WO Contrast    Result Date: 10/27/2021  EXAMINATION: CT OF THE HEAD WITHOUT CONTRAST  10/27/2021 12:06 am TECHNIQUE: CT of the head was performed without the administration of intravenous contrast. Dose modulation, iterative reconstruction, and/or weight based adjustment of the mA/kV was utilized to reduce the radiation dose to as low as reasonably achievable. COMPARISON: None. HISTORY: ORDERING SYSTEM PROVIDED HISTORY: confusion TECHNOLOGIST PROVIDED HISTORY: Reason for exam:->confusion Has a \"code stroke\" or \"stroke alert\" been called? ->No Decision Support Exception - unselect if not a suspected or confirmed emergency medical condition->Emergency Medical Condition (MA) Reason for Exam: confusion, fall last week FINDINGS: BRAIN/VENTRICLES: There is no acute intracranial hemorrhage, mass effect or midline shift. No abnormal extra-axial fluid collection. The gray-white differentiation is maintained without evidence of an acute infarct.  Hypoattenuation of the periventricular and subcortical white matter is suggestive of chronic small vessel ischemic disease. Mild diffuse parenchymal volume loss is noted. There is no evidence of hydrocephalus. ORBITS: The visualized portion of the orbits demonstrate no acute abnormality. SINUSES: The visualized paranasal sinuses and mastoid air cells demonstrate no acute abnormality. SOFT TISSUES/SKULL:  No acute abnormality of the visualized skull or soft tissues. No acute intracranial abnormality. MRI may be obtained if clinically indicated     CT LUMBAR SPINE WO CONTRAST    Result Date: 10/27/2021  EXAMINATION: CT OF THE LUMBAR SPINE WITHOUT CONTRAST  10/27/2021 TECHNIQUE: CT of the lumbar spine was performed without the administration of intravenous contrast. Multiplanar reformatted images are provided for review. Adjustment of mA and/or kV according to patient size was utilized. Dose modulation, iterative reconstruction, and/or weight based adjustment of the mA/kV was utilized to reduce the radiation dose to as low as reasonably achievable. COMPARISON: None HISTORY: ORDERING SYSTEM PROVIDED HISTORY: Pain after trauma TECHNOLOGIST PROVIDED HISTORY: Reason for exam:->pain post fall Decision Support Exception - unselect if not a suspected or confirmed emergency medical condition->Emergency Medical Condition (MA) Reason for Exam: Knee Pain (Pt states he had a gout flair up and fell friday; c/o pain bilateral knee to ankle pain. ) Acuity: Acute Type of Exam: Initial FINDINGS: BONES/ALIGNMENT: The bones are osteopenic. No acute fracture or subluxation is seen in the lumbar spine. No endplate erosion is noted. There is no definite lytic or sclerotic lesion. DEGENERATIVE CHANGES: Multilevel lumbar spondylosis is seen, most prominent at L4-L5 where there is moderate to severe right-sided neural foraminal narrowing. MRI may be obtained for detailed evaluation if clinically indicated. SOFT TISSUES/RETROPERITONEUM: Vascular calcification is noted.   Visualized abdominal viscera are unremarkable. No acute fracture or subluxation in the lumbar spine. XR HIP 2-3 VW W PELVIS RIGHT    Result Date: 10/27/2021  EXAMINATION: ONE XRAY VIEW OF THE PELVIS AND TWO XRAY VIEWS RIGHT HIP 10/27/2021 12:12 am COMPARISON: None. HISTORY: ORDERING SYSTEM PROVIDED HISTORY: r hip pain post fall TECHNOLOGIST PROVIDED HISTORY: Reason for exam:->r hip pain post fall Reason for Exam: fell last week, bruising Acuity: Acute Type of Exam: Initial FINDINGS: No fracture is identified. Joint space alignment is normal.  The soft tissues are unremarkable. Partially imaged is lower lumbar spondylosis. No definite fracture or malalignment. ED COURSE/MDM  Patient seen and evaluated. Old records reviewed. Labs and imaging reviewed and results discussed with patient. This is an 52-year-old male presenting with multiple areas of joint pain after he had a fall several days ago. Vital signs are within normal limits. He reportedly was febrile for EMS, is afebrile at 98 here. He is not tachycardic or hypoxic. On exam he is nontoxic-appearing, appears somewhat chronically ill. He describes having fallen several days ago, has not been able to walk for several days. After speaking with his daughter it appears that he is somewhat decompensating at home, has not been eating or drinking, or taking his Lasix over the past several days. He had stage II pressure ulcers related to sitting in his recliner and not moving. His work-up shows a leukocytosis, he takes prednisone chronically for gout. I do not see sign of infection here, blood cultures were sent however held off on antibiotics. The patient has an elevated BNP, questionable pulmonary edema on chest x-ray but again he is not hypoxic. His traumatic imaging is negative. There was report that he was somewhat altered at home intermittently, his CT head is negative.   It is unclear if his joint pain is related to gout or if he just has arthralgias from his fall several days ago. Regardless, he is not able to ambulate. He has pressure ulcers and is deconditioned and requires PT as he is not really functioning at home. The patient was hesitant on being admitted, however after speaking with his daughter and grandson, they came to the decision that he needed to be admitted because they are having trouble caring for him in his current state and do not feel they could transfer him to physical therapy if he were to go home. He is admitted for further treatment I spoke with Dr. Josephine Cruz who accepts.     During the patient's ED course, the patient was given:  Medications   cefTRIAXone (ROCEPHIN) 1000 mg IVPB in 50 mL D5W minibag (has no administration in time range)   azithromycin (ZITHROMAX) 500 mg in D5W 250ml addavial (has no administration in time range)   furosemide (LASIX) tablet 40 mg (has no administration in time range)   0.9 % sodium chloride infusion (has no administration in time range)   sodium chloride flush 0.9 % injection 5-40 mL (has no administration in time range)   sodium chloride flush 0.9 % injection 5-40 mL (has no administration in time range)   0.9 % sodium chloride infusion (has no administration in time range)   enoxaparin (LOVENOX) injection 40 mg (has no administration in time range)   ondansetron (ZOFRAN-ODT) disintegrating tablet 4 mg (has no administration in time range)     Or   ondansetron (ZOFRAN) injection 4 mg (has no administration in time range)   polyethylene glycol (GLYCOLAX) packet 17 g (has no administration in time range)   acetaminophen (TYLENOL) tablet 650 mg (has no administration in time range)     Or   acetaminophen (TYLENOL) suppository 650 mg (has no administration in time range)   potassium chloride (KLOR-CON M) extended release tablet 40 mEq (has no administration in time range)     Or   potassium bicarb-citric acid (EFFER-K) effervescent tablet 40 mEq (has no administration in time range)     Or potassium chloride 10 mEq/100 mL IVPB (Peripheral Line) (has no administration in time range)   magnesium sulfate 2000 mg in 50 mL IVPB premix (has no administration in time range)   oxyCODONE-acetaminophen (PERCOCET) 5-325 MG per tablet 1 tablet (1 tablet Oral Given 10/27/21 0048)        CLINICAL IMPRESSION  1. Ambulatory dysfunction    2. Leukocytosis, unspecified type    3. Acute pain of both knees    4. Pressure injury of buttock, stage 2, unspecified laterality ([facility] [address]    5. Generalized weakness        Blood pressure 109/65, pulse 86, temperature 98 °F (36.7 °C), temperature source Oral, resp. rate 18, height 5' 10\" (1.778 m), weight 180 lb (81.6 kg), SpO2 99 %. Patient was given scripts for the following medications. I counseled patient how to take these medications. New Prescriptions    No medications on file       Follow-up with:  No follow-up provider specified. DISCLAIMER: This chart was created using Dragon dictation software. Efforts were made by me to ensure accuracy, however some errors may be present due to limitations of this technology and occasionally words are not transcribed correctly.      Joe, [address]  10/27/21 2849

## 2021-10-27 NOTE — ED NOTES
Pt to edge of bed, was able to stand with assistance but not able to take a step; pt states he has been sitting in his recliner a lot and has 2 stage 2 pressure ulcers to coccyx; pt was not able to urinate; bladder scan revealed less than 89mls, pt abdomen soft; provider notified      Kel Henley RN  10/27/21 6523

## 2021-10-27 NOTE — ED NOTES
Report given to 2 [facility]. Transport scheduled at this time.       Sanna Nix RN  10/27/21 5437

## 2021-10-27 NOTE — H&P
Hospital Medicine History & Physical      PCP: No primary care provider on file. Date of Admission: 10/26/2021    Date of Service: Pt seen/examined on 10/27/2021     Chief Complaint:    Chief Complaint   Patient presents with    Knee Pain     Pt states he had a gout flair up and fell friday; c/o pain bilateral knee to ankle pain. History Of Present Illness: The patient is a [de-identified] y.o. male with hypertension, gout, CAD, chronic combined CHF/ischemic cardiomyopathy who presents to [facility] with c/o Bilateral lower extremity pain. States he fell on Friday. Since then he has continued to have worsening pain and has been more weak. He has not been able to walk. States he has pain from knees to feet. He reports chills last night and this morning. He has been taking prednisone at home to treat Gout. He denies cough, chest pain, dyspnea, abdominal pain, nausea, vomiting, diarrhea, or dysuria. Vitals stable. Labs with BNP 6,104, procal is 0.86 and WBC 16.7 K. CXR with pulmonary edema. X-ray right wrist negative. X-ray right knee negative. X-ray left foot with bony density along anterior talus, no evidence of fracture. X-ray hip negative. X-ray left knee negative. X-ray right ankle negative. X-ray right elbow with tiny density adjacent to the radial head that is indeterminate. CT lumbar spine negative. CT head negative. Admit to med-surg. Past Medical History:    History reviewed. No pertinent past medical history. Past Surgical History:    History reviewed. No pertinent surgical history. Medications Prior to Admission:    Prior to Admission medications    Medication Sig Start Date End Date Taking?  Authorizing Provider   furosemide (LASIX) 40 MG tablet Take 40 mg by mouth daily 1/6/21  Yes Historical Provider, MD   methylPREDNISolone (MEDROL DOSEPACK) 4 MG tablet follow package directions 3/11/21  Yes Historical Provider, MD   ofloxacin (OCUFLOX) 0.3 % solution  8/11/21 Historical Provider, MD   prednisoLONE acetate (PRED FORTE) 1 % ophthalmic suspension  9/7/21   Historical Provider, MD       Allergies:  Allopurinol and Prednisone    Social History:    TOBACCO:   reports that he has never smoked. He has never used smokeless tobacco.  ETOH:   reports no history of alcohol use. Family History:   Positive as follows:    History reviewed. No pertinent family history. REVIEW OF SYSTEMS:       Constitutional: Negative for fever + chills  Respiratory: Negative  for dyspnea, cough   Cardiovascular: Negative for chest pain   Gastrointestinal: Negative for vomiting, diarrhea   Genitourinary: Negative for hematuria   Musculoskeletal: +  Arthralgias, weakness  Skin: Negative for rash   Neurological: Negative for syncope   Psychiatric/Behavorial: Negative for anxiety    PHYSICAL EXAM:    /69   Pulse 79   Temp 97.7 °F (36.5 °C) (Oral)   Resp 16   Ht 5' 10\" (1.778 m)   Wt 180 lb (81.6 kg)   SpO2 99%   BMI 25.83 kg/m²     Gen: No distress. Alert. Eyes: PERRL. No sclera icterus. No conjunctival injection. ENT: No discharge. Pharynx clear. Neck:  Trachea midline. Resp: No accessory muscle use. No crackles. No wheezes. No rhonchi. CV: Regular rate. Regular rhythm. No murmur. No rub. No edema. GI: Non-tender. Non-distended. Normal bowel sounds. No hernia. Skin: Warm and dry. No nodule on exposed extremities. No rash on exposed extremities. M/S: No cyanosis. No joint deformity. No clubbing. Neuro: Awake. Grossly nonfocal    Psych: Oriented x 3. No anxiety or agitation.      CBC:   Recent Labs     10/27/21  0100   WBC 16.7*   HGB 13.3*   HCT 39.5*   .3*        BMP:   Recent Labs     10/27/21  0100   *   K 4.2   CL 99   CO2 24   BUN 20   CREATININE 0.8     LIVER PROFILE:   Recent Labs     10/27/21  0100   AST 47*   ALT 18   BILITOT 1.6*   ALKPHOS 84     UA:  Recent Labs     10/27/21  0202   COLORU Yellow   PHUR 6.0   WBCUA 0-2   RBCUA 0-2 BACTERIA 1+*   CLARITYU Clear   SPECGRAV 1.025   LEUKOCYTESUR Negative   UROBILINOGEN 2.0*   BILIRUBINUR SMALL*   BLOODU LARGE*   GLUCOSEU Negative   AMORPHOUS 1+          CULTURES  COVID: not detected    EKG:  I have reviewed the EKG with the following interpretation:   Sinus rhythm with Premature atrial complexes. Nonspecific ST abnormality    RADIOLOGY  XR CHEST PORTABLE   Final Result   Cardiomegaly and pulmonary edema. XR HIP 2-3 VW W PELVIS RIGHT   Final Result   No definite fracture or malalignment. XR KNEE LEFT (1-2 VIEWS)   Final Result   No definite fracture or malalignment. XR KNEE RIGHT (1-2 VIEWS)   Final Result   No fracture or malalignment. XR WRIST RIGHT (MIN 3 VIEWS)   Final Result   No acute fracture or malalignment. XR FOOT LEFT (MIN 3 VIEWS)   Final Result   The bony density along the anterior talus could signify a mid tarsal injury   or may be a degenerative spur. There is no other evidence for fracture. XR ANKLE RIGHT (MIN 3 VIEWS)   Final Result   No fracture or malalignment. XR ELBOW RIGHT (MIN 3 VIEWS)   Final Result   Tiny density adjacent to the radial head is indeterminate. This would be an   unusual appearance for a fracture and is probably degenerative. Recommend   repeat imaging once the patient is able to properly position his arm for   routine views. CT LUMBAR SPINE WO CONTRAST   Final Result   No acute fracture or subluxation in the lumbar spine. CT Head WO Contrast   Final Result   No acute intracranial abnormality. MRI may be obtained if clinically   indicated               Active Problems:    Inability to ambulate due to knee  Resolved Problems:    * No resolved hospital problems. *        ASSESSMENT/PLAN:  Gout  - continue Colchicine    Bilateral lower extremity pain  - hold off on steroids, antibiotics  - Percocet as needed.      Fall  - PT/OT    Leukocytosis- possibly related to recent use of prednisone  - monitor CBC off Antibiotics, steroids. Elevated procalcitonin  - unsure. Received Rocephin, Zithromax  - stopped antibiotics. Repeat Procal.     Macrocytosis   - repeat CBC    Large amount blood in urine  - needs to follow with PCP. Hypertension  - BP stable. Ischemic cardiomyopathy  Chronic combined CHF  - stable. No s/s decompensation  - continue Lasix 40 mg daily. -states he takes this as needed at home. F/w Dr. Trini Castro. CAD  - s/p angioplasty  - not on aspirin, statin, or BB  - F/w Dr. Trini Castro. DVT Prophylaxis: Lovenox  Diet: ADULT DIET; Regular  Code Status: Full Code    Plan of care Discussed with Dr. Amado Blanton.      [facility] FNP-C  10/27/2021

## 2021-10-27 NOTE — ED NOTES
Pt sitting up in bed watching t.v. pt is alert and oriented. Breakfast tray ordered for pt. VSS as documented. Pt refused Lovenox injection. Pt reswabbed for covid with rapid swab. Pt denies any needs at this time. Spoke with pt's daughter and update on pt was given. Ara's cell # is [phone]. Other number is [phone].       Eugene Faulkner RN  10/27/21 9135

## 2021-10-27 NOTE — CONSULTS
Pt just admitted from ER to [facility].  Photo obtained from staff RN. Deep tissue injury present. Venelex ointment recommended to promote blood supply to injured tissue. Offload, turn and reposition every 2 hours and prn.     Will follow

## 2021-10-27 NOTE — CARE COORDINATION
Case Management Assessment  Initial Evaluation      Patient Name: Tresa Boas, Dr.  YOB: 1940  Diagnosis: Inability to ambulate due to knee [R26.2]  Date / Time: 10/26/2021 11:07 PM    Admission status/Date: 10/27/2021 Inpatient   Chart Reviewed: Yes      Patient Interviewed: No-pt did not answer bedside phone   Family Interviewed:  Yes - pt's daughter Emily Lyons at [phone]      Hospitalization in the last 30 days:  No    Health Care Decision Maker : Pt's daughter Emily Lyons stated that she doesn't know if pt has a POA or living will but will discuss this with pt to see if he wants to complete forms while in the hospital. Emily Lyons stated pt's wife has dementia and she should be contacted first. She is aware to notify staff if they want to complete POA papers in the hospital as Spiritual care can assist with the forms if he is alert and oriented and stated agreement.     Met with: pt's daughter Wero Chan conducted  (bedside/phone): phone call     Current PCP: Christal Ocampo with [facility] required for SNF : Y          3 night stay required -  N    ADLS  Support Systems/Care Needs:    Transportation: self    Meal Preparation: self, eats out or daughter brings meals over    Housing  Living Arrangements: pt lives at home with his wife who has dementia  Steps: 0  Intent for return to present living arrangements: Yes per daughter  Identified Issues: John Paul Alvarezitlyn  Active with [address] : No Agency:(Services)     Passport/Waiver : No  :                      Phone Number:    Passport/Waiver Services: n/a          Durable Medical Equiptment   DME Provider: n/a  Equipment:   Walker___Cane_x__RTS___ BSC___Shower Chair_x__Hospital Bed___W/C_has access to a wheelchair from his daughter_Other___Rolator; RTS_____  02 at ____Liter(s)---wears(frequency)_______ [facility] ___ CPAP___ BiPap___   N/A____      Home O2 Use :  No    If No for home O2---if presently on O2 during hospitalization:  No  if yes CM to follow for potential DC O2 need  Informed of need for care provider to bring portable home O2 tank on day of discharge for nursing to connect prior to leaving:   Not Indicated  Verbalized agreement/Understanding:   Not Indicated    Community Service Affiliation  Dialysis:  No    · Agency:  · Location:  · Dialysis Schedule:  · Phone:   · Fax: Other Community Services:n/a    DISCHARGE PLAN: Explained Case Management role/services. Chart review completed. Attempted calling pt's bedside phone but it rang with no answer. Called and spoke with pt's daughter Grey Mireles as staff has been talking with her. Ara stated pt is normally independent at home. She stated pt plans on returning home. She stated PT/OT is ordered. Inquired about a SNF and she stated pt wont go to SNF as he won't leave his wife. She stated she works for the MD office at the office. She denied needs or questions for CM. CM will follow. Please notify CM if needs or concerns arise.

## 2021-10-27 NOTE — PROGRESS NOTES
Pharmacy-[facility]  Rx consulted to start [facility] .  Notified MD non formulary status  Martin KEANE 10/27/36204:47 PM  .

## 2021-10-27 NOTE — PLAN OF CARE
[de-identified] yoM who is reportedly under OP Tx for gout flare presents w/ bilat knee/ankle, R arm pain since mechanical fall on Fri. He was unable to bear weight in the ER. No clear Fx on plain films. Family reported to ER that pt has been intermittently confused, poor PO intake and not taking his lasix. He has hx of combined CHF (echo's in CE). CXR is pending.

## 2021-10-27 NOTE — ED NOTES
Pt's daughter at bedside requesting that wound care look at pt's buttocks for further eval, pt's family has been dressing wounds at home.       Anushka Dickens RN  10/27/21 5520

## 2021-10-28 VITALS
DIASTOLIC BLOOD PRESSURE: 67 MMHG | RESPIRATION RATE: 18 BRPM | TEMPERATURE: 97.1 F | BODY MASS INDEX: 29.07 KG/M2 | HEART RATE: 68 BPM | OXYGEN SATURATION: 96 % | HEIGHT: 70 IN | SYSTOLIC BLOOD PRESSURE: 113 MMHG | WEIGHT: 203.06 LBS

## 2021-10-28 PROBLEM — R26.2 INABILITY TO WALK: Status: ACTIVE | Noted: 2021-10-28

## 2021-10-28 LAB
A/G RATIO: 0.9 (ref 1.1–2.2)
ALBUMIN SERPL-MCNC: 3.5 G/DL (ref 3.4–5)
ALP BLD-CCNC: 78 U/L (ref 40–129)
ALT SERPL-CCNC: 22 U/L (ref 10–40)
ANION GAP SERPL CALCULATED.3IONS-SCNC: 16 MMOL/L (ref 3–16)
AST SERPL-CCNC: 51 U/L (ref 15–37)
BASOPHILS ABSOLUTE: 0 K/UL (ref 0–0.2)
BASOPHILS RELATIVE PERCENT: 0 %
BILIRUB SERPL-MCNC: 0.7 MG/DL (ref 0–1)
BUN BLDV-MCNC: 23 MG/DL (ref 7–20)
CALCIUM SERPL-MCNC: 9.6 MG/DL (ref 8.3–10.6)
CHLORIDE BLD-SCNC: 100 MMOL/L (ref 99–110)
CO2: 22 MMOL/L (ref 21–32)
CREAT SERPL-MCNC: 0.6 MG/DL (ref 0.8–1.3)
EOSINOPHILS ABSOLUTE: 0 K/UL (ref 0–0.6)
EOSINOPHILS RELATIVE PERCENT: 0 %
GFR AFRICAN AMERICAN: >60
GFR NON-AFRICAN AMERICAN: >60
GLOBULIN: 3.9 G/DL
GLUCOSE BLD-MCNC: 127 MG/DL (ref 70–99)
HCT VFR BLD CALC: 38.6 % (ref 40.5–52.5)
HEMOGLOBIN: 12.7 G/DL (ref 13.5–17.5)
LYMPHOCYTES ABSOLUTE: 1.1 K/UL (ref 1–5.1)
LYMPHOCYTES RELATIVE PERCENT: 6.8 %
MCH RBC QN AUTO: 33.8 PG (ref 26–34)
MCHC RBC AUTO-ENTMCNC: 33 G/DL (ref 31–36)
MCV RBC AUTO: 102.2 FL (ref 80–100)
MONOCYTES ABSOLUTE: 1.1 K/UL (ref 0–1.3)
MONOCYTES RELATIVE PERCENT: 6.5 %
NEUTROPHILS ABSOLUTE: 14.1 K/UL (ref 1.7–7.7)
NEUTROPHILS RELATIVE PERCENT: 86.7 %
PDW BLD-RTO: 13.5 % (ref 12.4–15.4)
PLATELET # BLD: 178 K/UL (ref 135–450)
PMV BLD AUTO: 8.9 FL (ref 5–10.5)
POTASSIUM REFLEX MAGNESIUM: 3.7 MMOL/L (ref 3.5–5.1)
PROCALCITONIN: 0.62 NG/ML (ref 0–0.15)
RBC # BLD: 3.77 M/UL (ref 4.2–5.9)
SODIUM BLD-SCNC: 138 MMOL/L (ref 136–145)
TOTAL PROTEIN: 7.4 G/DL (ref 6.4–8.2)
WBC # BLD: 16.3 K/UL (ref 4–11)

## 2021-10-28 PROCEDURE — 97110 THERAPEUTIC EXERCISES: CPT

## 2021-10-28 PROCEDURE — 6370000000 HC RX 637 (ALT 250 FOR IP): Performed by: INTERNAL MEDICINE

## 2021-10-28 PROCEDURE — 2580000003 HC RX 258: Performed by: INTERNAL MEDICINE

## 2021-10-28 PROCEDURE — G0378 HOSPITAL OBSERVATION PER HR: HCPCS

## 2021-10-28 PROCEDURE — 80053 COMPREHEN METABOLIC PANEL: CPT

## 2021-10-28 PROCEDURE — 97530 THERAPEUTIC ACTIVITIES: CPT

## 2021-10-28 PROCEDURE — 99238 HOSP IP/OBS DSCHRG MGMT 30/<: CPT | Performed by: INTERNAL MEDICINE

## 2021-10-28 PROCEDURE — 97161 PT EVAL LOW COMPLEX 20 MIN: CPT

## 2021-10-28 PROCEDURE — 97112 NEUROMUSCULAR REEDUCATION: CPT

## 2021-10-28 PROCEDURE — 84145 PROCALCITONIN (PCT): CPT

## 2021-10-28 PROCEDURE — 97116 GAIT TRAINING THERAPY: CPT

## 2021-10-28 PROCEDURE — 36415 COLL VENOUS BLD VENIPUNCTURE: CPT

## 2021-10-28 PROCEDURE — 85025 COMPLETE CBC W/AUTO DIFF WBC: CPT

## 2021-10-28 RX ORDER — FEBUXOSTAT 40 MG/1
40 TABLET, FILM COATED ORAL DAILY
Qty: 30 TABLET | Refills: 0 | Status: ON HOLD
Start: 2021-10-28 | End: 2022-08-20 | Stop reason: HOSPADM

## 2021-10-28 RX ORDER — FUROSEMIDE 40 MG/1
40 TABLET ORAL DAILY PRN
Qty: 1 TABLET | Refills: 0
Start: 2021-10-28 | End: 2022-02-25 | Stop reason: SDUPTHER

## 2021-10-28 RX ORDER — COLCHICINE 0.6 MG/1
0.6 CAPSULE ORAL 2 TIMES DAILY
Qty: 60 CAPSULE | Refills: 0 | Status: SHIPPED | OUTPATIENT
Start: 2021-10-28 | End: 2021-12-03 | Stop reason: SDUPTHER

## 2021-10-28 RX ADMIN — COLCHICINE 0.6 MG: 0.6 CAPSULE ORAL at 09:36

## 2021-10-28 RX ADMIN — Medication: at 09:41

## 2021-10-28 RX ADMIN — SODIUM CHLORIDE, PRESERVATIVE FREE 10 ML: 5 INJECTION INTRAVENOUS at 09:42

## 2021-10-28 RX ADMIN — FUROSEMIDE 40 MG: 40 TABLET ORAL at 12:00

## 2021-10-28 RX ADMIN — ACETAMINOPHEN 650 MG: 325 TABLET ORAL at 11:13

## 2021-10-28 ASSESSMENT — PAIN SCALES - GENERAL
PAINLEVEL_OUTOF10: 0
PAINLEVEL_OUTOF10: 0
PAINLEVEL_OUTOF10: 3
PAINLEVEL_OUTOF10: 4

## 2021-10-28 NOTE — PROGRESS NOTES
Comprehensive Nutrition Assessment    Type and Reason for Visit:  Initial, Positive Nutrition Screen (+ wounds)    Nutrition Recommendations/Plan:   1. Add Magic Cups with meals (patient likes orange flavor but he can choose which flavor he wants with meals) and please document po intake of ONS in the flow sheet  2. Continue current ADULT DIET; regular and document po intake in the flow sheet   3. Monitor pt to see if swallow evaluation is necessary d\t difficulty swallowing some foods and liquids  4. Monitor weight trends, nutrition-related labs, and bowel function (last stated BM 10/24)    Nutrition Assessment:  pt is nutritionally compromised AEB poor po intake x 4-5 days PTA; pt remains at risk for further compromise d\t ongoing poor appetite and poor po intake during this admission + increased nutrition needs r\t stage 2 pressure wound; will continue ADULT DIET; Regular + add magic cups with meals    Malnutrition Assessment:  Malnutrition Status: At risk for malnutrition  Context:  Acute Illness     Findings of the 6 clinical characteristics of malnutrition:  Energy Intake:  7 - 50% or less of estimated energy requirements for 5 or more days  Weight Loss:  No significant weight loss     Body Fat Loss:  No significant body fat loss     Muscle Mass Loss:  No significant muscle mass loss    Fluid Accumulation:  No significant fluid accumulation     Strength:  Not Performed    Estimated Daily Nutrient Needs:  Energy (kcal):  8592-8239 kcals based on 20-23kcal\kg\CBW; Weight Used for Energy Requirements:  Current     Protein (g):  106-121g of protein based on 1.4-1.6 g\kg\IBW (75.45kg);  Weight Used for Protein Requirements:  Ideal        Fluid (ml/day):  1842-2118ml; Method Used for Fluid Requirements:  1 ml/kcal      Nutrition Related Findings:  pt A&O x 3; pt presents to the ED c\o pain in his knees after a fall Friday (10/22); the pain is contributing to his inability to ambulate; + intermittently confused; pt was not eating or drinking 4-5 days PTA; pt stated he has not been hungry x 1 week; during this admission pt stated poor po intake; this am (10\28) pt ate only a couple bites of breakfast (1-25%); stated the eggs were unsatisfactory; consumed 51-75% of lunch today; pt stated he is not consuming adequate nutrition in order to try to lose wt + he believes consuming less po will decrease need to use restroom since patient does not want to ambulate to the restroom; pt reported that it is difficult for the pt to ambulate to the restroom in his home for a BM; last stated BM Kumar 10/24; abdomen is non-tender + non-distended + normal bowel sounds; pt also wants to lose wt so if he falls at home, it is easier for his wife to lift him; pt's goal weight is 170 lbs; ~ 4 days PTA pt had consumed only a small chocolate malt; pt stated him/his wife rarely prepare meals at home; they live close to 4 fast food restaurants, so they eat there; when asked about chewing or swallowing difficulty, pt responded by stating he occassionally chokes on food and liquids; pt has a stage 2 pressure wound on his buttocks; pt has a cane and walker at home but does not use them; pt resistent to trying Ensure Enlive with meals; educated on the importance of nutrition intake to aid in wound healing - will trial magic cups with meals since patient likes orange sherbet ice      Wounds:  None       Current Nutrition Therapies:    ADULT DIET; Regular  ADULT ORAL NUTRITION SUPPLEMENT; Breakfast, Lunch, Dinner; Frozen Oral Supplement    Anthropometric Measures:  · Height: 5' 10\" (177.8 cm)  · Current Body Weight: 203 lb 1 oz (92.1 kg) (RD obtained 10/28/21; [facility])   · Admission Body Weight: 180 lb (81.6 kg) (obtained 10/26/21; wt method not stated)    · Usual Body Weight: 194 lb (88 kg) (obtained 1/6/21; PCP)     · Ideal Body Weight: 166 lbs; % Ideal Body Weight 122.3 %   · BMI: 29.1  · BMI Categories: Overweight (BMI 25.0-29. 9)       Nutrition Diagnosis:   · Inadequate oral intake related to inadequate protein-energy intake, swallowing difficulty, increase demand for energy/nutrients, early satiety as evidenced by intake 51-75%, poor intake prior to admission, wounds, weight loss, lab values, constipation      Nutrition Interventions:   Food and/or Nutrient Delivery:  Continue Current Diet, Start Oral Nutrition Supplement  Nutrition Education/Counseling:  No recommendation at this time   Coordination of Nutrition Care:  Continue to monitor while inpatient    Goals:  pt will consume po >\= 50% of his meals and ONS x 3 meals\day       Nutrition Monitoring and Evaluation:   Behavioral-Environmental Outcomes:  None Identified   Food/Nutrient Intake Outcomes:  Food and Nutrient Intake, Supplement Intake  Physical Signs/Symptoms Outcomes:  Biochemical Data, Chewing or Swallowing, Constipation, Hemodynamic Status, Meal Time Behavior, Weight, Skin, Nutrition Focused Physical Findings     Discharge Planning:    Continue current diet     Electronically signed by Abilio Gonzalez on 10/28/21 at 12:50 PM EDT    Contact: [phone]

## 2021-10-28 NOTE — PROGRESS NOTES
Patient attempts to get out of bed unassisted several times throughout shift. He is unsteady and bends over with head further out than toes. He states he does not need help. He is encouraged to turn and use assistance with getting up to use urinal. Bed alarm remains on. Will continue to monitor.

## 2021-10-28 NOTE — CARE COORDINATION
[facility]  Received referral regarding [facility]. services from [address]. Per [address], [place] for PT [facility]. Explained to Phillip Roman pt may not have a dx for [facility] services. Sent request for coverage to [facility] to set up PT/OT [facility] services. Attempt to followup with pt. LM. Faxed referral with orders to [facility].         Electronically signed by Melanie Oviedo RN on 10/28/2021 at 1:31 PM

## 2021-10-28 NOTE — DISCHARGE INSTR - COC
Assisted  Transfer  Assisted  Bathing  Assisted  Dressing  Independent  Toileting  Independent  Feeding  Independent  Med Admin  Independent  Med Delivery   whole    Wound Care Documentation and Therapy:        Elimination:  Continence:   · Bowel: No  · Bladder: No  Urinary Catheter: None   Colostomy/Ileostomy/Ileal Conduit: No       Date of Last BM: 10/23/21    Intake/Output Summary (Last 24 hours) at 10/28/2021 1327  Last data filed at 10/28/2021 1052  Gross per 24 hour   Intake 420 ml   Output 1675 ml   Net -1255 ml     I/O last 3 completed shifts: In: 180 [P.O.:180]  Out: 2175 [Urine:2175]    Safety Concerns:     None    Impairments/Disabilities:      None    Nutrition Therapy:  Current Nutrition Therapy:   - Oral Diet:  General    Routes of Feeding: Oral  Liquids: No Restrictions  Daily Fluid Restriction: no  Last Modified Barium Swallow with Video (Video Swallowing Test): not done    Treatments at the Time of Hospital Discharge:   Respiratory Treatments: NA  Oxygen Therapy:  is not on home oxygen therapy.   Ventilator:    - No ventilator support    Rehab Therapies: Physical Therapy  Weight Bearing Status/Restrictions: No weight bearing restirctions  Other Medical Equipment (for information only, NOT a DME order):  walker  Other Treatments: NA    Patient's personal belongings (please select all that are sent with patient):  None    RN SIGNATURE:  Electronically signed by Nahed Hansen RN on 10/28/21 at 2:06 PM EDT    CASE MANAGEMENT/SOCIAL WORK SECTION    Inpatient Status Date: OBS    Readmission Risk Assessment Score:  Readmission Risk              Risk of Unplanned Readmission:  11           Discharging to Facility/ Agency   · Name: [facility]  · Address:  · Phone: [phone]  · Fax: [phone]    Dialysis Facility (if applicable)   · Name:  · Address:  · Dialysis Schedule:  · Phone:  · Fax:    / signature: Electronically signed by Satish Betancourt RN on 10/28/21 at 1:28 PM EDT    PHYSICIAN SECTION    Prognosis: {Prognosis:[id number]}    Condition at Discharge: Tera Ware Patient Condition:[id number]}    Rehab Potential (if transferring to Rehab): {Prognosis:[id number]}    Recommended Labs or Other Treatments After Discharge: PT/OT    Physician Certification: I certify the above information and transfer of [address].  is necessary for the continuing treatment of the diagnosis listed and that he requires Home Care for less 30 days.      Update Admission H&P: Changes in H&P as follows - see attached    PHYSICIAN SIGNATURE: BRITTNY Rizzo MD/ Electronically signed by Satish Betancourt RN on 10/28/21 at 1:28 PM EDT

## 2021-10-28 NOTE — PROGRESS NOTES
Inpatient Physical Therapy Evaluation and Treatment    Unit: [facility]  Date:  10/28/2021  Patient Name:    Malia Ferreira Dr.  Admitting diagnosis:  Generalized weakness [R53.1]  Inability to ambulate due to knee [R26.2]  Ambulatory dysfunction [R26.2]  Leukocytosis, unspecified type [D72.829]  Acute pain of both knees [M25.561, M25.562]  Pressure injury of buttock, stage 2, unspecified laterality ([facility] [L89.302]  Admit Date:  10/26/2021  Precautions/Restrictions/WB Status/ Lines/ Wounds/ Oxygen: Fall risk, Bed/chair alarm and Lines -IV    Treatment Time: 0936 - 1046  Treatment Number:  1   Timed Code Treatment Minutes: 60 minutes  Total Treatment Minutes:  70  minutes    Patient Goals for Therapy: \" Go home \"          Discharge Recommendations: ARU/IRF (inpatient rehab facility) ,   DME needs for discharge: RW       Therapy recommendation for EMS Transport: requires transport by cot due to difficulty to get up from wheelchair seat due to low sitting surface    Therapy recommendations for staff:   Assist of 1 with use of rolling walker (RW) and gait belt for all transfers and ambulation to/from chair  to/from bathroom  within room  within halls   Patient needed raised bed and raised recliner sitting surface to perform sit to stand with ease. History of Present Illness: H & P as per ALICIA Chavez CNP's note dated 10/27/2021  The patient is a [de-identified] y.o. male with hypertension, gout, CAD, chronic combined CHF/ischemic cardiomyopathy who presents to [facility] with c/o Bilateral lower extremity pain. States he fell on Friday. Since then he has continued to have worsening pain and has been more weak. He has not been able to walk. States he has pain from knees to feet. He reports chills last night and this morning. He has been taking prednisone at home to treat Gout. He denies cough, chest pain, dyspnea, abdominal pain, nausea, vomiting, diarrhea, or dysuria.       Vitals stable.   Labs with BNP 6,104, procal is 0.86 and WBC 16.7 K. CXR with pulmonary edema. X-ray right wrist negative. X-ray right knee negative. X-ray left foot with bony density along anterior talus, no evidence of fracture. X-ray hip negative. X-ray left knee negative. X-ray right ankle negative. X-ray right elbow with tiny density adjacent to the radial head that is indeterminate. CT lumbar spine negative. CT head negative. Admit to [address] S4 Level Recommendation:  Level 3 Safety  AM-PAC Mobility Score    AM-PAC Inpatient Mobility Raw Score : 17       Preadmission Environment    Pt. Lives with spouse (who has dementia but can stay by herself few hours, independent in all functional mobility)  Home environment:  Condo  Steps to enter first floor: 1 steps to enter  Steps to second floor: N/A  Bathroom: tub/shower unit, walk in shower, comfort height toilet, grab bars and shower chair  Equipment owned: rollator, [facility] and lift chair, sleeps in lift chair, shoe hook    Preadmission Status:  Pt. Able to drive: Yes  Pt Fully independent with ADLs: Yes  Pt. Required assistance from family for: Independent PTA  Pt. independent for transfers and gait and walked with intermittently with cane and rollator  History of falls Yes    Pain   Yes  Location: L knee and ankle   Ratin /10 during sit to stand, ambulation, 2/10 pain at rest.  Pain Medicine Status: Pain med requested    Cognition    A&O x4   Able to follow 2 step commands    Subjective  Patient lying supine in bed with no family present. Pt agreeable to this PT eval & tx. Upper Extremity ROM/Strength  Please see OT evaluation.       Lower Extremity ROM / Strength   AROM WFL: Yes  ROM limitations: N/A    Strength Assessment (measured on a 0-5 scale):  R LE   Quad   4   Ant Tib  4   Hamstring 4   Iliopsoas 4  L LE  Quad   4-   Ant Tib  4-   Hamstring 4-   Iliopsoas 4-    Lower Extremity Sensation    WFL    Lower Extremity Proprioception:   [facility] and Tone  WFL    Balance  Sitting:  Normal; SBA  Comments:     Standing: Fair ; CGA  Comments: 8 min    Bed Mobility   Supine to Sit:    SBA  Sit to Supine:   Not Tested  Rolling:   Not Tested  Scooting in sitting: SBA  Scooting in supine:  Not Tested    Transfer Training     Sit to stand: Max A  from standard height chair and lowest bed position using RW, CGA from raised bed surface with RW  Stand to sit:   CGA  Bed to Chair:   CGA with use of gait belt and rolling walker (RW)    Gait gait completed as indicated below  Distance:      150 ft  Deviations (firm surface/linoleum):  decreased shabnam, increased INDU, decreased step length bilaterally and decreased stance time bilaterally  Assistive Device Used:    gait belt and rolling walker (RW)  Level of Assist:    CGA  Comment:     Stair Training deferred, pt unsafe/ not appropriate to complete stairs at this time    Activity Tolerance   Pt completed therapy session with Pain noted with sit to stand activities and during ambulation, decreased pain levels on the L knee and L ankle after sitting down    Positioning Needs   Pt up in chair, alarm set, positioned in proper neutral alignment and pressure relief provided. Call light provided and all needs within reach  Ice provided and instructed in proper off/on schedule    Exercises Initiated  all completed bilaterally unless indicated  Ankle Pumps x 20 reps  LAQ x 10 reps  SLR x 10 reps  marching x 10 reps  Semi tandem standing hold x 10 sec x 5 reps   Perturbations in dynamic standing against moderate resistance x 10 reps with RW     Other  None. Patient/Family Education   Pt educated on role of inpatient PT, POC, importance of continued activity, DC recommendations, safety awareness, energy conservation, pacing activity and calling for assist with mobility. Assessment  Pt seen for Physical Therapy evaluation in acute care setting.   Pt demonstrated decreased Activity tolerance, Balance, ROM, Safety and Strength as well as decreased independence with Ambulation, Bed Mobility  and Transfers. Recommending ARU/IRF/Inpatient Rehab Facility upon discharge as patient functioning well below baseline, demonstrates good rehab potential and unable to return home due to limited or no family support, inability to negotiate stairs to enter home/bedroom/bathroom, burden of care beyond caregiver ability, home environment not conducive to patient recovery and limited safety awareness. Goals : To be met in 3 visits:  1). Independent with LE Ex x 10 reps    To be met in 6 visits:  1). Supine to/from sit: Independent  2). Sit to/from stand: Modified Independent  3). Bed to chair: Modified Independent  4). Gait: Ambulate 150 ft.  with  Modified Independent and use of LRAD (least restrictive assistive device)  5). Tolerate B LE exercises 3 sets of 10-15 reps  6). Ascend/descend 1 steps with Supervision with use of no handrail and LRAD (least restrictive assistive device)    Rehabilitation Potential: Good  Strengths for achieving goals include:   Pt motivated and Pt cooperative   Barriers to achieving goals include:    Pain    Plan    To be seen 5x / week  while in acute care setting for therapeutic exercises, bed mobility, transfers, progressive gait training, balance training, and family/patient education. Signature: Gray Fregoso MS PT, # [id number]    If patient discharges from this facility prior to next visit, this note will serve as the Discharge Summary.

## 2021-10-28 NOTE — PLAN OF CARE
Patient has difficulty sleeping this shift d/t frequency of urinating. Fall precautions in place. Will continue to monitor.

## 2021-10-28 NOTE — DISCHARGE SUMMARY
Name:  Cj Lloyd  Room:  0212/0212-01  MRN:    [id number]    Discharge Summary      This discharge summary is in conjunction with a complete physical exam done on the day of discharge. Discharging Physician: Rehan Irwin MD      Admit: 10/26/2021  Discharge:  10/28/2021    Diagnoses this Admission    Active Problems:    Ambulatory dysfunction  Resolved Problems:    * No resolved hospital problems. *          Procedures (Please Review Full Report for Details)  XR CHEST PORTABLE   Final Result   Cardiomegaly and pulmonary edema. XR HIP 2-3 VW W PELVIS RIGHT   Final Result   No definite fracture or malalignment. XR KNEE LEFT (1-2 VIEWS)   Final Result   No definite fracture or malalignment. XR KNEE RIGHT (1-2 VIEWS)   Final Result   No fracture or malalignment. XR WRIST RIGHT (MIN 3 VIEWS)   Final Result   No acute fracture or malalignment. XR FOOT LEFT (MIN 3 VIEWS)   Final Result   The bony density along the anterior talus could signify a mid tarsal injury   or may be a degenerative spur. There is no other evidence for fracture. XR ANKLE RIGHT (MIN 3 VIEWS)   Final Result   No fracture or malalignment. XR ELBOW RIGHT (MIN 3 VIEWS)   Final Result   Tiny density adjacent to the radial head is indeterminate. This would be an   unusual appearance for a fracture and is probably degenerative. Recommend   repeat imaging once the patient is able to properly position his arm for   routine views. CT LUMBAR SPINE WO CONTRAST   Final Result   No acute fracture or subluxation in the lumbar spine. CT Head WO Contrast   Final Result   No acute intracranial abnormality.   MRI may be obtained if clinically   indicated               Consults    IP CONSULT TO HOSPITALIST  IP CONSULT TO CASE MANAGEMENT  IP CONSULT TO PHARMACY      HPI:  The patient is a [de-identified] y.o. male with hypertension, gout, CAD, chronic combined CHF/ischemic cardiomyopathy who presents to [facility] with c/o Bilateral lower extremity pain. States he fell on Friday. Since then he has continued to have worsening pain and has been more weak. He has not been able to walk. States he has pain from knees to feet. He reports chills last night and this morning. He has been taking prednisone at home to treat Gout. He denies cough, chest pain, dyspnea, abdominal pain, nausea, vomiting, diarrhea, or dysuria.       Vitals stable. Labs with BNP 6,104, procal is 0.86 and WBC 16.7 K. CXR with pulmonary edema. X-ray right wrist negative. X-ray right knee negative. X-ray left foot with bony density along anterior talus, no evidence of fracture. X-ray hip negative. X-ray left knee negative. X-ray right ankle negative. X-ray right elbow with tiny density adjacent to the radial head that is indeterminate. CT lumbar spine negative. CT head negative. Admit to med-surg. Physical Exam at Discharge:  /62   Pulse 85   Temp 97.3 °F (36.3 °C) (Oral)   Resp 18   Ht 5' 10\" (1.778 m)   Wt 203 lb 1 oz (92.1 kg)   SpO2 99%   BMI 29.14 kg/m²     Gen: No distress. Alert. Eyes: PERRL. No sclera icterus. No conjunctival injection. ENT: No discharge. Pharynx clear. Neck:  Trachea midline. Resp: No accessory muscle use. No crackles. No wheezes. No rhonchi. CV: Regular rate. Regular rhythm. No murmur. No rub. No edema. GI: Non-tender. Non-distended. Normal bowel sounds. No hernia. Skin: Warm and dry. No nodule on exposed extremities. No rash on exposed extremities. M/S: No cyanosis. No joint deformity. No clubbing. Neuro: Awake. Grossly nonfocal    Psych: Oriented x 3. No anxiety or agitation. Hospital Course    Inability to walk  Likely secondary to muscle weakness from chronic steroid intake. OT PT consult    Gout  -Start colchicine 0.6 twice daily.   Uloric at discharge     Bilateral lower extremity pain  - hold off on steroids, antibiotics  - Percocet as needed.      Fall  - PT/OT     Leukocytosis- possibly related to recent use of prednisone  UA negative. Chest x-ray unremarkable. - monitor CBC off Antibiotics, steroids. Blood cultures negative     Elevated procalcitonin  - unsure. Received Rocephin, Zithromax  - stopped antibiotics. Repeat Procal.  Declining     Macrocytosis   - repeat CBC   Large amount blood in urine  - needs to follow with PCP.      Hypertension  - BP stable.      Ischemic cardiomyopathy  Chronic combined CHF  - stable. No s/s decompensation  - continue Lasix 40 mg daily. -states he takes this as needed at home. F/w Dr. Patti Lopez. He is not on any other medications for his CHF. He tells me that he would not take any medications. CAD  - s/p angioplasty  - not on aspirin, statin, or BB  - F/w Dr. Patti Lopez. He was seen by OT/PT. ARU was recommended. Patient refuses ARU or SNF. He wants to go home to be with his wife. Discharge home with 24/7 care. Will arrange for home physical therapy. XR CHEST PORTABLE   Final Result   Cardiomegaly and pulmonary edema. XR HIP 2-3 VW W PELVIS RIGHT   Final Result   No definite fracture or malalignment. XR KNEE LEFT (1-2 VIEWS)   Final Result   No definite fracture or malalignment. XR KNEE RIGHT (1-2 VIEWS)   Final Result   No fracture or malalignment. XR WRIST RIGHT (MIN 3 VIEWS)   Final Result   No acute fracture or malalignment. XR FOOT LEFT (MIN 3 VIEWS)   Final Result   The bony density along the anterior talus could signify a mid tarsal injury   or may be a degenerative spur. There is no other evidence for fracture. XR ANKLE RIGHT (MIN 3 VIEWS)   Final Result   No fracture or malalignment. XR ELBOW RIGHT (MIN 3 VIEWS)   Final Result   Tiny density adjacent to the radial head is indeterminate. This would be an   unusual appearance for a fracture and is probably degenerative.   Recommend   repeat imaging once the patient is able to properly position his arm for   routine views. CT LUMBAR SPINE WO CONTRAST   Final Result   No acute fracture or subluxation in the lumbar spine. CT Head WO Contrast   Final Result   No acute intracranial abnormality. MRI may be obtained if clinically   indicated                Discharge Medications     Medication List      START taking these medications    colchicine 0.6 MG capsule  Commonly known as: MITIGARE  Take 1 capsule by mouth 2 times daily     febuxostat 40 MG Tabs tablet  Commonly known as: Uloric  Take 1 tablet by mouth daily        CHANGE how you take these medications    furosemide 40 MG tablet  Commonly known as: LASIX  Take 1 tablet by mouth daily as needed (edema)  What changed:   · when to take this  · reasons to take this        STOP taking these medications    methylPREDNISolone 4 MG tablet  Commonly known as: MEDROL DOSEPACK           Where to Get Your Medications      These medications were sent to [address], [address] [place] [address], [address] [address]    Phone: [phone]   · colchicine 0.6 MG capsule  · febuxostat 40 MG Tabs tablet     Information about where to get these medications is not yet available    Ask your nurse or doctor about these medications  · furosemide 40 MG tablet           Discharge Condition/Location: Stable    Follow Up: Follow up with PCP.         Tsering Aguilar MD 10/28/2021 1:23 PM

## 2021-10-28 NOTE — FLOWSHEET NOTE
10/28/21 0930   Vital Signs   Temp 97.3 °F (36.3 °C)   Temp Source Oral   Pulse 85   Heart Rate Source Monitor   Resp 18   /62   BP Location Right upper arm   Patient Position Sitting   Level of Consciousness Alert (0)   MEWS Score 1   Patient Currently in Pain Denies   Oxygen Therapy   SpO2 99 %   O2 Device None (Room air)     Pt assessment completed, vss, see flow sheet. Pt denies any pain medication or needs at this time.  Katie Blakely, RN

## 2021-10-28 NOTE — PLAN OF CARE
Nutrition Problem #1: Inadequate oral intake  Intervention: Food and/or Nutrient Delivery: Continue Current Diet, Start Oral Nutrition Supplement  Nutritional Goals: pt will consume po >\= 50% of his meals and ONS x 3 meals\day

## 2021-10-29 ENCOUNTER — TELEPHONE (OUTPATIENT)
Dept: INTERNAL MEDICINE CLINIC | Age: 81
End: 2021-10-29

## 2021-10-29 NOTE — TELEPHONE ENCOUNTER
Called Patient to make a hospital follow up appt. He stated he has a PCP Marco Bell with [facility] in [facility].

## 2021-10-31 LAB
BLOOD CULTURE, ROUTINE: NORMAL
CULTURE, BLOOD 2: NORMAL

## 2021-11-19 ENCOUNTER — OFFICE VISIT (OUTPATIENT)
Dept: INTERNAL MEDICINE CLINIC | Age: 81
End: 2021-11-19

## 2021-11-19 VITALS
BODY MASS INDEX: 27.49 KG/M2 | SYSTOLIC BLOOD PRESSURE: 120 MMHG | WEIGHT: 192 LBS | HEIGHT: 70 IN | DIASTOLIC BLOOD PRESSURE: 68 MMHG

## 2021-11-19 DIAGNOSIS — I25.10 CORONARY ARTERY DISEASE INVOLVING NATIVE CORONARY ARTERY OF NATIVE HEART WITHOUT ANGINA PECTORIS: ICD-10-CM

## 2021-11-19 DIAGNOSIS — M1A.9XX0 CHRONIC GOUT WITHOUT TOPHUS, UNSPECIFIED CAUSE, UNSPECIFIED SITE: ICD-10-CM

## 2021-11-19 DIAGNOSIS — I50.22 CHRONIC SYSTOLIC CONGESTIVE HEART FAILURE (HCC): Primary | ICD-10-CM

## 2021-11-19 DIAGNOSIS — R27.0 ATAXIA: ICD-10-CM

## 2021-11-19 DIAGNOSIS — E55.9 VITAMIN D DEFICIENCY: ICD-10-CM

## 2021-11-19 DIAGNOSIS — R70.0 ELEVATED SED RATE: ICD-10-CM

## 2021-11-19 PROCEDURE — 99214 OFFICE O/P EST MOD 30 MIN: CPT | Performed by: INTERNAL MEDICINE

## 2021-11-19 ASSESSMENT — ENCOUNTER SYMPTOMS
BLOOD IN STOOL: 0
SHORTNESS OF BREATH: 0
NAUSEA: 0
ABDOMINAL PAIN: 0
VOMITING: 0
COUGH: 0
DIARRHEA: 0
CHEST TIGHTNESS: 0
WHEEZING: 0

## 2021-11-19 NOTE — PROGRESS NOTES
Lorna Hilton Dr. (:  1940) is a [de-identified] y.o. male,Established patient, here for evaluation of the following chief complaint(s):  Follow-up         ASSESSMENT/PLAN:   Diagnosis Orders   1. Chronic systolic congestive heart failure (HCC)  BRAIN NATRIURETIC PEPTIDE (BNP)   2. Chronic gout without tophus, unspecified cause, unspecified site  Basic Metabolic Panel    CBC Auto Differential    Uric Acid   3. Elevated sed rate  SEDIMENTATION RATE   4. Ataxia  Vitamin B12    VL DUP CAROTID BILATERAL   5. Vitamin D deficiency  Vitamin D 25 Hydroxy   6. Coronary artery disease involving native coronary artery of native heart without angina pectoris         CHF. Stable. Lasix prn  refuses other meds    CAD s/p stents  Not on meds  Refuses to take meds    Chronic gout. Continue Uloric. Decrease colchicine to once daily for a week and then discontinue. Check uric acid level in 2 weeks. He had elevated sedimentation rate during his recent hospital admission. We will repeat ESR. Ataxia. Dizziness with extension of his neck. Check vitamin B12 and vitamin D levels. Recent noncontrast head CT negative. Check carotid Dopplers. Advised to see dermatology for the skin lesions in scalp    Subjective   SUBJECTIVE/OBJECTIVE:  HPI  Is here for follow up     H/o ischemic CMP- sees cardiology  Not taking lasix anymore. Monitors weight daily. Denies short of breath,cp. History of coronary disease status post stents  Sees cardiology. He refuses to take aspirin or statin. Complains of unsteadiness of gait. Also has been having skin lesions on his scalp. Review of Systems   Constitutional: Negative. Negative for fatigue and fever. Respiratory: Negative for cough, chest tightness, shortness of breath and wheezing. Cardiovascular: Positive for leg swelling. Negative for chest pain and palpitations. Gastrointestinal: Negative for abdominal pain, blood in stool, diarrhea, nausea and vomiting. Genitourinary: Negative for hematuria. Neurological: Positive for dizziness. Negative for headaches. Ataxia          Objective   Physical Exam  Constitutional:       Appearance: He is well-developed. HENT:      Head: Normocephalic and atraumatic. Eyes:      Pupils: Pupils are equal, round, and reactive to light. Neck:      Thyroid: No thyromegaly. Cardiovascular:      Rate and Rhythm: Normal rate and regular rhythm. Heart sounds: Normal heart sounds. No murmur heard. No friction rub. No gallop. Comments: No carotid bruit  Pulmonary:      Effort: Pulmonary effort is normal. No respiratory distress. Breath sounds: Normal breath sounds. No wheezing or rales. Chest:      Chest wall: No tenderness. Abdominal:      General: Bowel sounds are normal. There is no distension. Palpations: Abdomen is soft. There is no mass. Tenderness: There is no abdominal tenderness. There is no guarding or rebound. Musculoskeletal:      Cervical back: Normal range of motion and neck supple. Comments: 1 + jose pedal edema   Neurological:      Mental Status: He is alert and oriented to person, place, and time. Cranial Nerves: No cranial nerve deficit. Coordination: Coordination normal.                  An electronic signature was used to authenticate this note.     --Helen Perez MD

## 2021-12-03 DIAGNOSIS — R53.1 GENERALIZED WEAKNESS: Primary | ICD-10-CM

## 2021-12-03 RX ORDER — COLCHICINE 0.6 MG/1
0.6 CAPSULE ORAL 2 TIMES DAILY
Qty: 60 CAPSULE | Refills: 2 | Status: SHIPPED | OUTPATIENT
Start: 2021-12-03 | End: 2022-03-18

## 2021-12-03 NOTE — TELEPHONE ENCOUNTER
Patient's home care called stating that patient is having a gout flair up. Per Dr Roselle Sandhoff colchicine was refilled.   Home care also informed to have BMP done for very little urine output

## 2021-12-06 ENCOUNTER — HOSPITAL ENCOUNTER (OUTPATIENT)
Age: 81
Setting detail: SPECIMEN
Discharge: HOME | End: 2021-12-06
Payer: COMMERCIAL

## 2021-12-06 LAB
ANION GAP SERPL CALCULATED.3IONS-SCNC: 11 MMOL/L (ref 3–16)
BUN BLDV-MCNC: 13 MG/DL (ref 7–20)
CALCIUM SERPL-MCNC: 9.7 MG/DL (ref 8.3–10.6)
CHLORIDE BLD-SCNC: 104 MMOL/L (ref 99–110)
CO2: 24 MMOL/L (ref 21–32)
CREAT SERPL-MCNC: 0.7 MG/DL (ref 0.8–1.3)
GFR AFRICAN AMERICAN: >60
GFR NON-AFRICAN AMERICAN: >60
GLUCOSE BLD-MCNC: 104 MG/DL (ref 70–99)
POTASSIUM SERPL-SCNC: 3.9 MMOL/L (ref 3.5–5.1)
SODIUM BLD-SCNC: 139 MMOL/L (ref 136–145)

## 2021-12-06 PROCEDURE — 80048 BASIC METABOLIC PNL TOTAL CA: CPT

## 2022-02-25 ENCOUNTER — TELEPHONE (OUTPATIENT)
Dept: INTERNAL MEDICINE CLINIC | Age: 82
End: 2022-02-25

## 2022-02-25 RX ORDER — FUROSEMIDE 40 MG/1
40 TABLET ORAL DAILY PRN
Qty: 30 TABLET | Refills: 2 | Status: ON HOLD
Start: 2022-02-25 | End: 2022-08-20 | Stop reason: HOSPADM

## 2022-02-25 NOTE — TELEPHONE ENCOUNTER
----- Message from Aleja Gonzales sent at 2/25/2022  1:10 PM EST -----  Contact: Ara/daughter [phone]  Patient needs refill for furosemide (LASIX) 40 MG tablet.     Maylin Duffy [address] [place] [address] [phone] - F [phone]    Thank you

## 2022-03-18 ENCOUNTER — TELEPHONE (OUTPATIENT)
Dept: INTERNAL MEDICINE CLINIC | Age: 82
End: 2022-03-18

## 2022-03-18 RX ORDER — COLCHICINE 0.6 MG/1
0.6 CAPSULE ORAL 2 TIMES DAILY
Qty: 60 CAPSULE | Refills: 2 | Status: ON HOLD
Start: 2022-03-18 | End: 2022-08-20 | Stop reason: HOSPADM

## 2022-03-18 NOTE — TELEPHONE ENCOUNTER
----- Message from Kale Hernandez sent at 3/18/2022  3:11 PM EDT -----  Contact: [facility] [phone]  Per Dr Dat Chung, ok same dose and directions as colchicine   ----- Message -----  From: Nancy Damon  Sent: 3/18/2022   2:34 PM EDT  To: Kale Grant MD    Please send new prescription for Mitigare to replace the colchicine (MITIGARE) 0.6 MG capsule.     [address] [phone] - F [phone]    Thank you

## 2022-08-04 DIAGNOSIS — Z00.00 ROUTINE GENERAL MEDICAL EXAMINATION AT A HEALTH CARE FACILITY: Primary | ICD-10-CM

## 2022-08-04 DIAGNOSIS — Z00.00 ROUTINE GENERAL MEDICAL EXAMINATION AT A HEALTH CARE FACILITY: ICD-10-CM

## 2022-08-05 LAB
A/G RATIO: 1.5 (ref 1.1–2.2)
ALBUMIN SERPL-MCNC: 4 G/DL (ref 3.4–5)
ALP BLD-CCNC: 101 U/L (ref 40–129)
ALT SERPL-CCNC: 15 U/L (ref 10–40)
ANION GAP SERPL CALCULATED.3IONS-SCNC: 15 MMOL/L (ref 3–16)
AST SERPL-CCNC: 18 U/L (ref 15–37)
BASOPHILS ABSOLUTE: 0 K/UL (ref 0–0.2)
BASOPHILS RELATIVE PERCENT: 0.5 %
BILIRUB SERPL-MCNC: 1.2 MG/DL (ref 0–1)
BUN BLDV-MCNC: 39 MG/DL (ref 7–20)
CALCIUM SERPL-MCNC: 9.5 MG/DL (ref 8.3–10.6)
CHLORIDE BLD-SCNC: 101 MMOL/L (ref 99–110)
CO2: 25 MMOL/L (ref 21–32)
CREAT SERPL-MCNC: 1 MG/DL (ref 0.8–1.3)
EOSINOPHILS ABSOLUTE: 0 K/UL (ref 0–0.6)
EOSINOPHILS RELATIVE PERCENT: 0.5 %
GFR AFRICAN AMERICAN: >60
GFR NON-AFRICAN AMERICAN: >60
GLUCOSE BLD-MCNC: 142 MG/DL (ref 70–99)
HCT VFR BLD CALC: 41.7 % (ref 40.5–52.5)
HEMOGLOBIN: 14 G/DL (ref 13.5–17.5)
LYMPHOCYTES ABSOLUTE: 0.9 K/UL (ref 1–5.1)
LYMPHOCYTES RELATIVE PERCENT: 16.9 %
MCH RBC QN AUTO: 35.1 PG (ref 26–34)
MCHC RBC AUTO-ENTMCNC: 33.6 G/DL (ref 31–36)
MCV RBC AUTO: 104.5 FL (ref 80–100)
MONOCYTES ABSOLUTE: 0.6 K/UL (ref 0–1.3)
MONOCYTES RELATIVE PERCENT: 11.8 %
NEUTROPHILS ABSOLUTE: 3.8 K/UL (ref 1.7–7.7)
NEUTROPHILS RELATIVE PERCENT: 70.3 %
PDW BLD-RTO: 15.6 % (ref 12.4–15.4)
PLATELET # BLD: 148 K/UL (ref 135–450)
PMV BLD AUTO: 10 FL (ref 5–10.5)
POTASSIUM SERPL-SCNC: 4.8 MMOL/L (ref 3.5–5.1)
RBC # BLD: 3.99 M/UL (ref 4.2–5.9)
SODIUM BLD-SCNC: 141 MMOL/L (ref 136–145)
TOTAL PROTEIN: 6.7 G/DL (ref 6.4–8.2)
WBC # BLD: 5.5 K/UL (ref 4–11)

## 2022-08-08 ENCOUNTER — HOSPITAL ENCOUNTER (OUTPATIENT)
Dept: CT IMAGING | Age: 82
Discharge: HOME | End: 2022-08-08
Payer: COMMERCIAL

## 2022-08-08 DIAGNOSIS — R63.4 WEIGHT LOSS: ICD-10-CM

## 2022-08-08 PROCEDURE — 74177 CT ABD & PELVIS W/CONTRAST: CPT

## 2022-08-08 PROCEDURE — 6360000004 HC RX CONTRAST MEDICATION: Performed by: NURSE PRACTITIONER

## 2022-08-08 PROCEDURE — 71270 CT THORAX DX C-/C+: CPT

## 2022-08-08 RX ADMIN — IOHEXOL 50 ML: 240 INJECTION, SOLUTION INTRATHECAL; INTRAVASCULAR; INTRAVENOUS; ORAL at 16:25

## 2022-08-08 RX ADMIN — IOPAMIDOL 75 ML: 755 INJECTION, SOLUTION INTRAVENOUS at 16:23

## 2022-08-17 ENCOUNTER — APPOINTMENT (OUTPATIENT)
Dept: CT IMAGING | Age: 82
DRG: 682 | End: 2022-08-17
Payer: COMMERCIAL

## 2022-08-17 ENCOUNTER — HOSPITAL ENCOUNTER (INPATIENT)
Age: 82
LOS: 3 days | Discharge: HOSPICE | DRG: 682 | End: 2022-08-20
Attending: STUDENT IN AN ORGANIZED HEALTH CARE EDUCATION/TRAINING PROGRAM | Admitting: INTERNAL MEDICINE
Payer: COMMERCIAL

## 2022-08-17 ENCOUNTER — APPOINTMENT (OUTPATIENT)
Dept: GENERAL RADIOLOGY | Age: 82
DRG: 682 | End: 2022-08-17
Payer: COMMERCIAL

## 2022-08-17 DIAGNOSIS — R79.89 ELEVATED LACTIC ACID LEVEL: ICD-10-CM

## 2022-08-17 DIAGNOSIS — R13.19 ESOPHAGEAL DYSPHAGIA: ICD-10-CM

## 2022-08-17 DIAGNOSIS — D69.6 THROMBOCYTOPENIA (HCC): ICD-10-CM

## 2022-08-17 DIAGNOSIS — N17.9 AKI (ACUTE KIDNEY INJURY) (HCC): Primary | ICD-10-CM

## 2022-08-17 DIAGNOSIS — R79.9 ELEVATED BUN: ICD-10-CM

## 2022-08-17 PROBLEM — R13.10 DYSPHAGIA: Status: ACTIVE | Noted: 2022-08-17

## 2022-08-17 PROBLEM — R63.4 WEIGHT LOSS: Status: ACTIVE | Noted: 2022-08-17

## 2022-08-17 LAB
A/G RATIO: 1.4 (ref 1.1–2.2)
ALBUMIN SERPL-MCNC: 4.2 G/DL (ref 3.4–5)
ALP BLD-CCNC: 159 U/L (ref 40–129)
ALT SERPL-CCNC: 326 U/L (ref 10–40)
ANION GAP SERPL CALCULATED.3IONS-SCNC: 20 MMOL/L (ref 3–16)
AST SERPL-CCNC: 227 U/L (ref 15–37)
BASE EXCESS VENOUS: -5.9 MMOL/L (ref -3–3)
BASOPHILS ABSOLUTE: 0 K/UL (ref 0–0.2)
BASOPHILS RELATIVE PERCENT: 0 %
BILIRUB SERPL-MCNC: 4.1 MG/DL (ref 0–1)
BUN BLDV-MCNC: 121 MG/DL (ref 7–20)
CALCIUM SERPL-MCNC: 10.4 MG/DL (ref 8.3–10.6)
CARBOXYHEMOGLOBIN: 1.8 % (ref 0–1.5)
CHLORIDE BLD-SCNC: 101 MMOL/L (ref 99–110)
CO2: 21 MMOL/L (ref 21–32)
CREAT SERPL-MCNC: 1.8 MG/DL (ref 0.8–1.3)
EKG ATRIAL RATE: 90 BPM
EKG DIAGNOSIS: NORMAL
EKG P AXIS: 90 DEGREES
EKG P-R INTERVAL: 178 MS
EKG Q-T INTERVAL: 390 MS
EKG QRS DURATION: 112 MS
EKG QTC CALCULATION (BAZETT): 477 MS
EKG R AXIS: 41 DEGREES
EKG T AXIS: 177 DEGREES
EKG VENTRICULAR RATE: 90 BPM
EOSINOPHILS ABSOLUTE: 0 K/UL (ref 0–0.6)
EOSINOPHILS RELATIVE PERCENT: 0 %
GFR AFRICAN AMERICAN: 44
GFR NON-AFRICAN AMERICAN: 36
GLUCOSE BLD-MCNC: 97 MG/DL (ref 70–99)
HCO3 VENOUS: 19.3 MMOL/L (ref 23–29)
HCT VFR BLD CALC: 47 % (ref 40.5–52.5)
HEMOGLOBIN: 15.5 G/DL (ref 13.5–17.5)
INFLUENZA A: NOT DETECTED
INFLUENZA B: NOT DETECTED
LACTIC ACID: 2.9 MMOL/L (ref 0.4–2)
LACTIC ACID: 3.1 MMOL/L (ref 0.4–2)
LACTIC ACID: 3.2 MMOL/L (ref 0.4–2)
LIPASE: 44 U/L (ref 13–60)
LYMPHOCYTES ABSOLUTE: 0.5 K/UL (ref 1–5.1)
LYMPHOCYTES RELATIVE PERCENT: 8 %
MCH RBC QN AUTO: 35.5 PG (ref 26–34)
MCHC RBC AUTO-ENTMCNC: 33 G/DL (ref 31–36)
MCV RBC AUTO: 107.5 FL (ref 80–100)
METHEMOGLOBIN VENOUS: 0.3 %
MONOCYTES ABSOLUTE: 0.4 K/UL (ref 0–1.3)
MONOCYTES RELATIVE PERCENT: 6 %
NEUTROPHILS ABSOLUTE: 5.8 K/UL (ref 1.7–7.7)
NEUTROPHILS RELATIVE PERCENT: 86 %
NUCLEATED RED BLOOD CELLS: 3 /100 WBC
O2 SAT, VEN: 69 %
O2 THERAPY: ABNORMAL
PCO2, VEN: 37.7 MMHG (ref 40–50)
PDW BLD-RTO: 16.7 % (ref 12.4–15.4)
PH VENOUS: 7.33 (ref 7.35–7.45)
PLATELET # BLD: 51 K/UL (ref 135–450)
PLATELET SLIDE REVIEW: ABNORMAL
PMV BLD AUTO: 10.6 FL (ref 5–10.5)
PO2, VEN: 38.6 MMHG (ref 25–40)
POTASSIUM REFLEX MAGNESIUM: 4.7 MMOL/L (ref 3.5–5.1)
PRO-BNP: ABNORMAL PG/ML (ref 0–449)
RBC # BLD: 4.37 M/UL (ref 4.2–5.9)
SARS-COV-2 RNA, RT PCR: NOT DETECTED
SLIDE REVIEW: ABNORMAL
SODIUM BLD-SCNC: 142 MMOL/L (ref 136–145)
TCO2 CALC VENOUS: 21 MMOL/L
TOTAL PROTEIN: 7.3 G/DL (ref 6.4–8.2)
TROPONIN: 0.03 NG/ML
TROPONIN: 0.04 NG/ML
WBC # BLD: 6.7 K/UL (ref 4–11)

## 2022-08-17 PROCEDURE — 2060000000 HC ICU INTERMEDIATE R&B

## 2022-08-17 PROCEDURE — 70450 CT HEAD/BRAIN W/O DYE: CPT

## 2022-08-17 PROCEDURE — 2580000003 HC RX 258: Performed by: INTERNAL MEDICINE

## 2022-08-17 PROCEDURE — 83605 ASSAY OF LACTIC ACID: CPT

## 2022-08-17 PROCEDURE — 83880 ASSAY OF NATRIURETIC PEPTIDE: CPT

## 2022-08-17 PROCEDURE — 93005 ELECTROCARDIOGRAM TRACING: CPT | Performed by: STUDENT IN AN ORGANIZED HEALTH CARE EDUCATION/TRAINING PROGRAM

## 2022-08-17 PROCEDURE — 96360 HYDRATION IV INFUSION INIT: CPT

## 2022-08-17 PROCEDURE — 87040 BLOOD CULTURE FOR BACTERIA: CPT

## 2022-08-17 PROCEDURE — 82803 BLOOD GASES ANY COMBINATION: CPT

## 2022-08-17 PROCEDURE — 93010 ELECTROCARDIOGRAM REPORT: CPT | Performed by: INTERNAL MEDICINE

## 2022-08-17 PROCEDURE — 80053 COMPREHEN METABOLIC PANEL: CPT

## 2022-08-17 PROCEDURE — 2580000003 HC RX 258: Performed by: NURSE PRACTITIONER

## 2022-08-17 PROCEDURE — 74176 CT ABD & PELVIS W/O CONTRAST: CPT

## 2022-08-17 PROCEDURE — 36415 COLL VENOUS BLD VENIPUNCTURE: CPT

## 2022-08-17 PROCEDURE — 99285 EMERGENCY DEPT VISIT HI MDM: CPT

## 2022-08-17 PROCEDURE — 85025 COMPLETE CBC W/AUTO DIFF WBC: CPT

## 2022-08-17 PROCEDURE — 83690 ASSAY OF LIPASE: CPT

## 2022-08-17 PROCEDURE — 2580000003 HC RX 258: Performed by: STUDENT IN AN ORGANIZED HEALTH CARE EDUCATION/TRAINING PROGRAM

## 2022-08-17 PROCEDURE — 70490 CT SOFT TISSUE NECK W/O DYE: CPT

## 2022-08-17 PROCEDURE — 84484 ASSAY OF TROPONIN QUANT: CPT

## 2022-08-17 PROCEDURE — 96361 HYDRATE IV INFUSION ADD-ON: CPT

## 2022-08-17 PROCEDURE — 99223 1ST HOSP IP/OBS HIGH 75: CPT | Performed by: NURSE PRACTITIONER

## 2022-08-17 PROCEDURE — 87636 SARSCOV2 & INF A&B AMP PRB: CPT

## 2022-08-17 PROCEDURE — 71045 X-RAY EXAM CHEST 1 VIEW: CPT

## 2022-08-17 RX ORDER — SODIUM CHLORIDE 9 MG/ML
INJECTION, SOLUTION INTRAVENOUS CONTINUOUS
Status: DISCONTINUED | OUTPATIENT
Start: 2022-08-17 | End: 2022-08-20 | Stop reason: HOSPADM

## 2022-08-17 RX ORDER — ONDANSETRON 4 MG/1
4 TABLET, ORALLY DISINTEGRATING ORAL EVERY 8 HOURS PRN
Status: DISCONTINUED | OUTPATIENT
Start: 2022-08-17 | End: 2022-08-20 | Stop reason: HOSPADM

## 2022-08-17 RX ORDER — SODIUM CHLORIDE 0.9 % (FLUSH) 0.9 %
5-40 SYRINGE (ML) INJECTION EVERY 12 HOURS SCHEDULED
Status: DISCONTINUED | OUTPATIENT
Start: 2022-08-17 | End: 2022-08-20 | Stop reason: HOSPADM

## 2022-08-17 RX ORDER — ACETAMINOPHEN 325 MG/1
650 TABLET ORAL EVERY 6 HOURS PRN
Status: DISCONTINUED | OUTPATIENT
Start: 2022-08-17 | End: 2022-08-18

## 2022-08-17 RX ORDER — SODIUM CHLORIDE 0.9 % (FLUSH) 0.9 %
5-40 SYRINGE (ML) INJECTION PRN
Status: DISCONTINUED | OUTPATIENT
Start: 2022-08-17 | End: 2022-08-20 | Stop reason: HOSPADM

## 2022-08-17 RX ORDER — SODIUM CHLORIDE 9 MG/ML
INJECTION, SOLUTION INTRAVENOUS PRN
Status: DISCONTINUED | OUTPATIENT
Start: 2022-08-17 | End: 2022-08-20 | Stop reason: HOSPADM

## 2022-08-17 RX ORDER — ACETAMINOPHEN 650 MG/1
650 SUPPOSITORY RECTAL EVERY 6 HOURS PRN
Status: DISCONTINUED | OUTPATIENT
Start: 2022-08-17 | End: 2022-08-18

## 2022-08-17 RX ORDER — 0.9 % SODIUM CHLORIDE 0.9 %
500 INTRAVENOUS SOLUTION INTRAVENOUS ONCE
Status: COMPLETED | OUTPATIENT
Start: 2022-08-17 | End: 2022-08-17

## 2022-08-17 RX ORDER — 0.9 % SODIUM CHLORIDE 0.9 %
500 INTRAVENOUS SOLUTION INTRAVENOUS ONCE
Status: COMPLETED | OUTPATIENT
Start: 2022-08-18 | End: 2022-08-18

## 2022-08-17 RX ORDER — POLYETHYLENE GLYCOL 3350 17 G/17G
17 POWDER, FOR SOLUTION ORAL DAILY PRN
Status: DISCONTINUED | OUTPATIENT
Start: 2022-08-17 | End: 2022-08-20 | Stop reason: HOSPADM

## 2022-08-17 RX ORDER — ONDANSETRON 2 MG/ML
4 INJECTION INTRAMUSCULAR; INTRAVENOUS EVERY 6 HOURS PRN
Status: DISCONTINUED | OUTPATIENT
Start: 2022-08-17 | End: 2022-08-20 | Stop reason: HOSPADM

## 2022-08-17 RX ADMIN — SODIUM CHLORIDE: 9 INJECTION, SOLUTION INTRAVENOUS at 15:54

## 2022-08-17 RX ADMIN — SODIUM CHLORIDE 500 ML: 9 INJECTION, SOLUTION INTRAVENOUS at 11:19

## 2022-08-17 RX ADMIN — SODIUM CHLORIDE 500 ML: 9 INJECTION, SOLUTION INTRAVENOUS at 23:59

## 2022-08-17 ASSESSMENT — LIFESTYLE VARIABLES
HOW OFTEN DO YOU HAVE A DRINK CONTAINING ALCOHOL: NEVER
HOW MANY STANDARD DRINKS CONTAINING ALCOHOL DO YOU HAVE ON A TYPICAL DAY: PATIENT DOES NOT DRINK
HOW OFTEN DO YOU HAVE A DRINK CONTAINING ALCOHOL: NEVER

## 2022-08-17 ASSESSMENT — PAIN - FUNCTIONAL ASSESSMENT: PAIN_FUNCTIONAL_ASSESSMENT: NONE - DENIES PAIN

## 2022-08-17 NOTE — PROGRESS NOTES
4 Eyes Skin Assessment     The patient is being assess for   Admission    I agree that 2 RN's have performed a thorough Head to Toe Skin Assessment on the patient. ALL assessment sites listed below have been assessed. Areas assessed for pressure by both nurses:   [x]   Head, Face, and Ears   [x]   Shoulders, Back, and Chest, Abdomen  [x]   Arms, Elbows, and Hands   [x]   Coccyx, Sacrum, and Ischium  [x]   Legs, Feet, and Heels      Blanchable redness on dorsal spinal between scapulas. Non-blanchable redness on AMY buttocks   Skin Assessed Under all Medical Devices by both nurses:  N/A              All Mepilex Borders were peeled back and area peeked at by both nurses:  No: N/A  Please list where Mepilex Borders are located:  None on admission. Mepilex added to coccyx and dorsal spine upon assessment. **SHARE this note so that the co-signing nurse is able to place an eSignature**    Co-signer eSignature: Electronically signed by Onofre Greenfield RN on 8/17/22 at 6:29 PM EDT    Does the Patient have Skin Breakdown related to pressure?   Yes LDA WOUND CARE was Initiated documentation include the Sivan-wound, Wound Assessment, Measurements, Dressing Treatment, Drainage, and Color\",     (Insert Photo here  )         Ton Prevention initiated:  Yes   Wound Care Orders initiated:  Yes      [address] nurse consulted for Pressure Injury (Stage 3,4, Unstageable, DTI, NWPT, Complex wounds)and New or Established Ostomies:  NA      Primary Nurse eSignature: Electronically signed by Leandra Tong RN on 8/17/22 at 6:28 PM EDT

## 2022-08-17 NOTE — PROGRESS NOTES
Admit to PCU on tele  Fatigue, weakness  Decline in health    Ardena Ormond [facility]  8/17/2022

## 2022-08-17 NOTE — FLOWSHEET NOTE
08/17/22 1921   Handoff   Communication Given Shift Handoff   Handoff Given To Dominguez Marques Received From [facility] Communication Face to Face; At bedside   Time Handoff Given 1922   End of Shift Check Performed Yes

## 2022-08-17 NOTE — PLAN OF CARE
Problem: Discharge Planning  Goal: Discharge to home or other facility with appropriate resources  Outcome: Progressing  Flowsheets (Taken 8/17/2022 1750)  Discharge to home or other facility with appropriate resources:   Identify barriers to discharge with patient and caregiver   Identify discharge learning needs (meds, wound care, etc)   Refer to discharge planning if patient needs post-hospital services based on physician order or complex needs related to functional status, cognitive ability or social support system   Arrange for needed discharge resources and transportation as appropriate   Arrange for interpreters to assist at discharge as needed     Problem: Safety - Adult  Goal: Free from fall injury  Outcome: Progressing     Problem: ABCDS Injury Assessment  Goal: Absence of physical injury  Outcome: Progressing     Problem: Skin/Tissue Integrity  Goal: Absence of new skin breakdown  Description: 1. Monitor for areas of redness and/or skin breakdown  2. Assess vascular access sites hourly  3. Every 4-6 hours minimum:  Change oxygen saturation probe site  4. Every 4-6 hours:  If on nasal continuous positive airway pressure, respiratory therapy assess nares and determine need for appliance change or resting period.   Outcome: Progressing

## 2022-08-17 NOTE — H&P
Hospital Medicine History & Physical      PCP: Vincent Marrufo MD    Date of Admission: 8/17/2022    Date of Service: Pt seen/examined on 8/17/2022     Chief Complaint:    Chief Complaint   Patient presents with    Fatigue     Pt has not ate or drank for 3 weeks. Family told EMS that pt has lost 100 lbs in 6 months. Pts family believes he has cancer but hasn't gotten diagnosed. History Of Present Illness: The patient is a 81 y.o. male with ischemic cardiomyopathy/Chronic combined CHF, CAD, Gout, and hypertension who presents to [facility] with c/o fatigue and weakness. Patient has had difficulty swallowing and eating for a while. He has had a significant decline in the last 6 weeks. He states yesterday he slid out of his chair and was unable to get up off the floor. He came to the ED today for evaluation. He has not wanted an Endoscopy, but has decided he would be okay if this is needed. He has a grandson getting  on Friday and wants to be there. He has lost a significant amount of weight in the last 6 months. He was 203 lbs on Oct 2021. Now weight is 127 lbs. He is taking in some liquids. Sometimes he gets choked on the liquids. If he eats solids, he feels like it sits there and it doesn't go down. He has shortness of breath at times. Labs with CRUZITO, lactic acidosis, BNP > 70,000, troponin 0.04, elevated LFT's, hyperbilirubinemia, macrocytosis, and thrombocytopenia. Admitted to PCU on tele. GI consulted.       Past Medical History:        Diagnosis Date    Chronic combined systolic and diastolic CHF (congestive heart failure) (HCC)     Coronary artery disease involving native coronary artery of native heart without angina pectoris     Gout     Hypertension     Ischemic cardiomyopathy        Past Surgical History:        Procedure Laterality Date    CORONARY ANGIOPLASTY         Medications Prior to Admission:    Prior to Admission medications    Medication Sig Start Date End Date Taking? Authorizing Provider   colchicine (MITIGARE) 0.6 MG capsule Take 1 capsule by mouth 2 times daily 3/18/22   Jan Baez MD   furosemide (LASIX) 40 MG tablet Take 1 tablet by mouth daily as needed (edema) 2/25/22   Jan Baez MD   febuxostat (ULORIC) 40 MG TABS tablet Take 1 tablet by mouth daily 10/28/21   Jan Baez MD       Allergies:  Allopurinol and Prednisone    Social History:  The patient currently lives at home. TOBACCO:   reports that he has never smoked. He has never used smokeless tobacco.  ETOH:   reports no history of alcohol use. Family History:   Positive as follows:        Problem Relation Age of Onset    Cancer Mother        REVIEW OF SYSTEMS:       Constitutional: Negative for fever +fatigue, poor PO intake, weight loss  HENT: +dysphagia   Respiratory: Negative for cough +dyspnea  Cardiovascular: Negative for chest pain   Gastrointestinal: Negative for vomiting, diarrhea   Musculoskeletal: +weakness      PHYSICAL EXAM:    /66   Pulse 88   Temp (!) 96.7 °F (35.9 °C) (Axillary)   Resp 15   Ht 5' 11\" (1.803 m)   Wt 127 lb 1.6 oz (57.7 kg)   SpO2 98%   BMI 17.73 kg/m²     Gen: No distress. Alert. Chronically ill-appearing, frail, elderly male  Eyes: PERRL. No sclera icterus. No conjunctival injection. ENT: No discharge. Pharynx clear. Neck: No JVD. No Carotid Bruit. Trachea midline. Resp: No accessory muscle use. No crackles. No wheezes. No rhonchi. CV: Regular rate. Regular rhythm. No murmur. No rub. +1 BLE edema. GI: Non-tender. Non-distended. Normal bowel sounds. No hernia. Skin: Warm and dry. No nodule on exposed extremities. No rash on exposed extremities. M/S: No cyanosis. No joint deformity. No clubbing. Neuro: Awake. Grossly nonfocal    Psych: Oriented x 3. No anxiety or agitation.      CBC:   Recent Labs     08/17/22  1111   WBC 6.7   HGB 15.5   HCT 47.0   .5*   PLT 51*     BMP: Recent Labs     08/17/22  1111      K 4.7      CO2 21   *   CREATININE 1.8*     LIVER PROFILE:   Recent Labs     08/17/22  1111   *   *   LIPASE 44.0   BILITOT 4.1*   ALKPHOS 159*         CARDIAC ENZYMES  Recent Labs     08/17/22  1111   TROPONINI 0.04*       CULTURES  COVID/influenza: not detected  Blood: pending    EKG:  I have reviewed the EKG with the following interpretation:   Sinus rhythm with Premature atrial complexes, Low voltage QRS, Incomplete left bundle branch block, ST & T wave abnormality, consider lateral ischemia. Prolonged QT. Abnormal ECG. When compared with ECG of 27-OCT-2021 00:55, Nonspecific T wave abnormality now evident in Inferior leads, T wave inversion now evident in Lateral leads     RADIOLOGY  CT HEAD WO CONTRAST   Final Result      1. No evidence of acute intracranial process. 2.  Findings of presumed small vessel ischemic deep white matter disease. CT SOFT TISSUE NECK WO CONTRAST   Final Result   No acute abnormality of the soft tissue structures of the neck visualized. Incidental findings as detailed above. CT CHEST ABDOMEN PELVIS WO CONTRAST   Final Result   1. No acute process in the chest, abdomen or pelvis. 2. No convincing evidence of malignancy. 3. Small right and trace left pleural effusions. 4. Mild nonspecific pulmonary fibrosis. 5. Few mildly enlarged mediastinal lymph nodes, nonspecific but favored to be   reactive. 6. Few solid pulmonary nodules measuring up to 5 mm. If the patient is low   risk for lung cancer, no follow-up is advised. If high risk, optional   follow-up chest CT in 12 months per Fleischner criteria. The   7. Cholelithiasis. 8. Punctate layering bladder calculus. XR CHEST PORTABLE   Final Result   Mild pulmonary vascular congestion. Echo 8/14/2019  Study Conclusions     - Left ventricle: The cavity size is moderately dilated.  Wall     thickness is normal. Systolic function was moderately reduced. The estimated ejection fraction was in the range of 30% to 35%. Moderate diffuse hypokinesis. Doppler parameters are consistent     with a restrictive pattern, indicative of decreased left     ventricular diastolic compliance and increased left atrial     pressure (grade 3 diastolic dysfunction). The global longitudinal     strain was -10%. - Aortic valve: There was mild regurgitation.   - Mitral valve: There was moderate regurgitation, directed     eccentrically and toward the free wall. - Left atrium: The atrium is mildly dilated. - Right atrium: The atrium is mildly dilated. - Inferior vena cava: The vessel was normal in size; the     respirophasic diameter changes were in the normal range (&gt;= 50%);     findings are consistent with normal central venous pressure. Principal Problem:    CRUZITO (acute kidney injury) ([facility] [address]  Resolved Problems:    * No resolved hospital problems. *        ASSESSMENT/PLAN:  CRUZITO  -previous creatinine values 0.7-1  -creatinine on admission 1.8  -likely due to poor PO intake, dysphagia  -IVF's. Monitor BMP    Lactic acidosis  -IVF's, trend Lactic    Elevated LFT's  Hyperbilirubinemia   -Trend LFT's    Macrocytosis  -monitor CBC    Dysphagia  -trouble swallowing, eating  -chokes on water  -SLP eval  -GI Consult. Patient is agreeable to EGD if needed    Mediastinal lymph nodes  -on CT. Favored to be reactive    Pulmonary nodules  -no follow up advised  -non-smoker    Ischemic cardiomyopathy  Chronic combined CHF  -monitor for s/s decompensation  -currently receiving IVF's. Monitor closely    CAD  -s/p coronary angioplasty  -not on any medications    Hypertension  -BP stable. Gout  -held home regimen currently  -can likely resume tomorrow    Severe PCM  -dietician consult    DVT Prophylaxis: Held Lovenox; Platelets 51  Diet: ADULT DIET; Clear Liquid  Diet NPO  Code Status: DNR-CC    Code status discussed with patient. He is a DNR-CC    [place]ia ORTIZ-C  8/17/2022

## 2022-08-17 NOTE — PROGRESS NOTES
Patient admitted to room 308 from ER. Side rails up x2. Patient has an order for telemetry. Bed is locked and in lowest position. Call light placed in patient reach. Patient explained the routine of the hospital, including but not limited to lab work, vital signs, hourly rounding, etc. Care plans and education updated, CHG wipes completed at time of admission. /62   Pulse 84   Temp 97.7 °F (36.5 °C) (Oral)   Resp 16   Ht 5' 11\" (1.803 m)   Wt 127 lb 1.6 oz (57.7 kg)   SpO2 96%   BMI 17.73 kg/m²     Most recent set of vitals as shown.

## 2022-08-17 NOTE — ED PROVIDER NOTES
Occupational History    Not on file   Tobacco Use    Smoking status: Never    Smokeless tobacco: Never   Substance and Sexual Activity    Alcohol use: Never    Drug use: Never    Sexual activity: Not on file   Other Topics Concern    Not on file   Social History Narrative    Not on file     Social Determinants of Health     Financial Resource Strain: Not on file   Food Insecurity: Not on file   Transportation Needs: Not on file   Physical Activity: Not on file   Stress: Not on file   Social Connections: Not on file   Intimate Partner Violence: Not on file   Housing Stability: Not on file     No current facility-administered medications for this encounter. Current Outpatient Medications   Medication Sig Dispense Refill    colchicine (MITIGARE) 0.6 MG capsule Take 1 capsule by mouth 2 times daily 60 capsule 2    furosemide (LASIX) 40 MG tablet Take 1 tablet by mouth daily as needed (edema) 30 tablet 2    febuxostat (ULORIC) 40 MG TABS tablet Take 1 tablet by mouth daily 30 tablet 0     Allergies   Allergen Reactions    Allopurinol Anaphylaxis, Itching and Rash    Prednisone Anxiety     Became \"wired\" per pt       Nursing Notes Reviewed    Physical Exam:  Triage VS:    ED Triage Vitals [08/17/22 1045]   Enc Vitals Group      BP 94/70      Heart Rate 86      Resp 22      Temp (!) 96.7 °F (35.9 °C)      Temp Source Axillary      SpO2 93 %      Weight 137 lb (62.1 kg)      Height 5' 10\" (1.778 m)      Head Circumference       Peak Flow       Pain Score       Pain Loc       Pain Edu? Excl. in [address]? My pulse ox interpretation is - normal    General appearance:   Cachectic  Skin:  Warm. Dry. Eye:  Extraocular movements intact. Ears, nose, mouth and throat:  Oral mucosa moist   Neck:  Trachea midline. Extremity:  No swelling. Normal ROM     Heart:  Regular rate and rhythm, normal S1 & S2, no extra heart sounds. Perfusion:  intact  Respiratory:  Lungs clear to auscultation bilaterally. Respirations nonlabored. Abdominal:  Normal bowel sounds. Soft. Nontender. Non distended. Back:  No CVA tenderness to palpation     Neurological:  Alert and oriented times 3. No focal neuro deficits. Generalized weakness but no pronator drift of the bilateral upper and lower extremities, normal sensation light touch of the bilateral upper and lower extremities, no facial asymmetry, normal sensation light touch of the face, extraocular eye movements are intact, pupils are 3 mm reactive bilaterally patient is alert and oriented patient has normal finger-nose-finger and heel shin, patient does have mild slurring of speech and difficulty swallowing which patient states has been going on for several weeks          Psychiatric:  Appropriate    I have reviewed and interpreted all of the currently available lab results from this visit (if applicable):  No results found for this visit on 08/17/22. Radiographs (if obtained):  Radiologist's Report Reviewed:  No results found. EKG (if obtained): (All EKG's are interpreted by myself in the absence of a cardiologist)  Normal sinus rhythm, ventricular rate 90, MD interval 178, QRS duration 112, QTc 477, incomplete left bundle branch block, no significant ST elevation or depression    MDM:    28-year-old male presenting with history seen above. Patient's temp is 96.7 on presentation. Otherwise vitals are reassuring and patient is satting well on room air. Physical exam can be seen above. Clear to auscultation, cardiac Franco is reassuring, abdomen has some mild discomfort without rebound or guarding. Patient's neuro exam patient has some mild slurred speech with difficulty swallowing. Otherwise exam is nonfocal.  Broad work-up obtained including CT head, CT chest abdomen pelvis as well as CBC, CMP, Trope, EKG, lipase, UA, lactate. EKG can be seen above. CT head shows no acute abnormalities.   There is findings of presumed small vessel ischemic deep white matter disease. CT soft tissue neck was nonacute. CT chest abdomen pelvis shows no acute process in the chest abdomen pelvis. No convincing evidence of malignancy the. There is a small right and trace left pleural effusion. There are mildly enlarged mediastinal lymph nodes nonspecific but favored to be reactive. There is a few solid pulmonary nodules measuring up to 5 mm. CBC reveals no leukocytosis. Hemoglobin is within normal limits. Patient is thrombocytopenic at 46. Baseline is 148. Patient has an CRUZITO with a creatinine of 1.8. BUN is 121. Troponin is elevated 0.04 which is likely in part secondary to acute kidney injury and type II in nature. Patient denies any chest pain or shortness of breath currently. Lactate is elevated 2.9. Patient given fluids in the ED. BNP is over 70,000. Rapid flu and rapid COVID are negative. Blood cultures are obtained and pending but have low suspicion for acute infection at this time. We will admit patient to hospital service for further evaluation and treatment. Clinical Impression:  1. CRUZITO (acute kidney injury) ([address]    2. Esophageal dysphagia    3. Elevated BUN    4. Thrombocytopenia (HCC)    5. Elevated lactic acid level          Comment: Please note this report has been produced using speech recognition software and may contain errors related to that system including errors in grammar, punctuation, and spelling, as well as words and phrases that may be inappropriate. Efforts were made to edit the dictations.         Sera Morris MD  08/19/22 1492

## 2022-08-17 NOTE — PROGRESS NOTES
Patient is not able to demonstrated the ability to move from a reclining position to an upright position within the recliner. however patient is alert, oriented and able to provide informed consent    Bedside Mobility Assessment Tool (BMAT):     Assessment Level 1- Sit and Shake    1. From a semi-reclined position, ask patient to sit up and rotate to a seated position at the side of the bed. Can use the bedrail. 2. Ask patient to reach out and grab your hand and shake making sure patient reaches across his/her midline. Fail- Patient is unable to perform tasks, patient is MOBILITY LEVEL 1. Assessment Level 2- Stretch and Point   1. With patient in seated position at the side of the bed, have patient place both feet on the floor (or stool) with knees no higher than hips. 2. Ask patient to stretch one leg and straighten the knee, then bend the ankle/flex and point the toes. If appropriate, repeat with the other leg. Fail- Patient is unable to complete task. Patient is MOBILITY LEVEL 2. Assessment Level 3- Stand   1. Ask patient to elevate off the bed or chair (seated to standing) using an assistive device (cane, bedrail). 2. Patient should be able to raise buttocks off be and hold for a count of five. May repeat once. Fail- Patient unable to demonstrate standing stability. Patient is MOBILITY LEVEL 3. Assessment Level 4- Walk   1. Ask patient to march in place at bedside. 2. Then ask patient to advance step and return each foot. Some medical conditions may render a patient from stepping backwards, use your best clinical judgement. Fail- Patient not able to complete tasks OR requires use of assistive device. Patient is MOBILITY LEVEL 3.        Mobility Level- 1

## 2022-08-18 ENCOUNTER — APPOINTMENT (OUTPATIENT)
Dept: ULTRASOUND IMAGING | Age: 82
DRG: 682 | End: 2022-08-18
Payer: COMMERCIAL

## 2022-08-18 PROBLEM — E43 SEVERE PROTEIN-CALORIE MALNUTRITION (HCC): Chronic | Status: ACTIVE | Noted: 2022-08-18

## 2022-08-18 LAB
A/G RATIO: 1.3 (ref 1.1–2.2)
ALBUMIN SERPL-MCNC: 3.7 G/DL (ref 3.4–5)
ALP BLD-CCNC: 146 U/L (ref 40–129)
ALT SERPL-CCNC: 289 U/L (ref 10–40)
ANION GAP SERPL CALCULATED.3IONS-SCNC: 21 MMOL/L (ref 3–16)
ANISOCYTOSIS: ABNORMAL
AST SERPL-CCNC: 240 U/L (ref 15–37)
BASOPHILIC STIPPLING: ABNORMAL
BASOPHILS ABSOLUTE: 0 K/UL (ref 0–0.2)
BASOPHILS RELATIVE PERCENT: 0 %
BILIRUB SERPL-MCNC: 4.3 MG/DL (ref 0–1)
BUN BLDV-MCNC: 121 MG/DL (ref 7–20)
CALCIUM SERPL-MCNC: 9.6 MG/DL (ref 8.3–10.6)
CHLORIDE BLD-SCNC: 106 MMOL/L (ref 99–110)
CO2: 18 MMOL/L (ref 21–32)
CREAT SERPL-MCNC: 1.7 MG/DL (ref 0.8–1.3)
EOSINOPHILS ABSOLUTE: 0 K/UL (ref 0–0.6)
EOSINOPHILS RELATIVE PERCENT: 0 %
GFR AFRICAN AMERICAN: 47
GFR NON-AFRICAN AMERICAN: 39
GLUCOSE BLD-MCNC: 75 MG/DL (ref 70–99)
HCT VFR BLD CALC: 47.6 % (ref 40.5–52.5)
HEMOGLOBIN: 15.8 G/DL (ref 13.5–17.5)
LACTIC ACID: 2.7 MMOL/L (ref 0.4–2)
LACTIC ACID: 3 MMOL/L (ref 0.4–2)
LACTIC ACID: 3.3 MMOL/L (ref 0.4–2)
LYMPHOCYTES ABSOLUTE: 0.3 K/UL (ref 1–5.1)
LYMPHOCYTES RELATIVE PERCENT: 5 %
MCH RBC QN AUTO: 36.1 PG (ref 26–34)
MCHC RBC AUTO-ENTMCNC: 33.2 G/DL (ref 31–36)
MCV RBC AUTO: 108.6 FL (ref 80–100)
MONOCYTES ABSOLUTE: 0.1 K/UL (ref 0–1.3)
MONOCYTES RELATIVE PERCENT: 2 %
NEUTROPHILS ABSOLUTE: 6.1 K/UL (ref 1.7–7.7)
NEUTROPHILS RELATIVE PERCENT: 93 %
NUCLEATED RED BLOOD CELLS: 8 /100 WBC
PDW BLD-RTO: 17.2 % (ref 12.4–15.4)
PLATELET # BLD: 36 K/UL (ref 135–450)
PLATELET SLIDE REVIEW: ABNORMAL
PMV BLD AUTO: 10.3 FL (ref 5–10.5)
POLYCHROMASIA: ABNORMAL
POTASSIUM REFLEX MAGNESIUM: 4.8 MMOL/L (ref 3.5–5.1)
RBC # BLD: 4.38 M/UL (ref 4.2–5.9)
SLIDE REVIEW: ABNORMAL
SODIUM BLD-SCNC: 145 MMOL/L (ref 136–145)
TOTAL PROTEIN: 6.6 G/DL (ref 6.4–8.2)
TROPONIN: 0.03 NG/ML
WBC # BLD: 6.6 K/UL (ref 4–11)

## 2022-08-18 PROCEDURE — 83550 IRON BINDING TEST: CPT

## 2022-08-18 PROCEDURE — 88342 IMHCHEM/IMCYTCHM 1ST ANTB: CPT

## 2022-08-18 PROCEDURE — 99152 MOD SED SAME PHYS/QHP 5/>YRS: CPT | Performed by: INTERNAL MEDICINE

## 2022-08-18 PROCEDURE — 82105 ALPHA-FETOPROTEIN SERUM: CPT

## 2022-08-18 PROCEDURE — 7100000010 HC PHASE II RECOVERY - FIRST 15 MIN: Performed by: INTERNAL MEDICINE

## 2022-08-18 PROCEDURE — 3609012400 HC EGD TRANSORAL BIOPSY SINGLE/MULTIPLE: Performed by: INTERNAL MEDICINE

## 2022-08-18 PROCEDURE — 85025 COMPLETE CBC W/AUTO DIFF WBC: CPT

## 2022-08-18 PROCEDURE — 82104 ALPHA-1-ANTITRYPSIN PHENO: CPT

## 2022-08-18 PROCEDURE — 76705 ECHO EXAM OF ABDOMEN: CPT

## 2022-08-18 PROCEDURE — 0DB68ZX EXCISION OF STOMACH, VIA NATURAL OR ARTIFICIAL OPENING ENDOSCOPIC, DIAGNOSTIC: ICD-10-PCS | Performed by: INTERNAL MEDICINE

## 2022-08-18 PROCEDURE — 84484 ASSAY OF TROPONIN QUANT: CPT

## 2022-08-18 PROCEDURE — 2060000000 HC ICU INTERMEDIATE R&B

## 2022-08-18 PROCEDURE — 99233 SBSQ HOSP IP/OBS HIGH 50: CPT | Performed by: INTERNAL MEDICINE

## 2022-08-18 PROCEDURE — 83605 ASSAY OF LACTIC ACID: CPT

## 2022-08-18 PROCEDURE — 80074 ACUTE HEPATITIS PANEL: CPT

## 2022-08-18 PROCEDURE — 88305 TISSUE EXAM BY PATHOLOGIST: CPT

## 2022-08-18 PROCEDURE — 86015 ACTIN ANTIBODY EACH: CPT

## 2022-08-18 PROCEDURE — 2709999900 HC NON-CHARGEABLE SUPPLY: Performed by: INTERNAL MEDICINE

## 2022-08-18 PROCEDURE — 6360000002 HC RX W HCPCS: Performed by: INTERNAL MEDICINE

## 2022-08-18 PROCEDURE — 2580000003 HC RX 258: Performed by: NURSE PRACTITIONER

## 2022-08-18 PROCEDURE — 80053 COMPREHEN METABOLIC PANEL: CPT

## 2022-08-18 PROCEDURE — 7100000011 HC PHASE II RECOVERY - ADDTL 15 MIN: Performed by: INTERNAL MEDICINE

## 2022-08-18 PROCEDURE — 82728 ASSAY OF FERRITIN: CPT

## 2022-08-18 PROCEDURE — 82103 ALPHA-1-ANTITRYPSIN TOTAL: CPT

## 2022-08-18 PROCEDURE — 86038 ANTINUCLEAR ANTIBODIES: CPT

## 2022-08-18 PROCEDURE — 36415 COLL VENOUS BLD VENIPUNCTURE: CPT

## 2022-08-18 PROCEDURE — 83540 ASSAY OF IRON: CPT

## 2022-08-18 RX ORDER — MIDAZOLAM HYDROCHLORIDE 5 MG/ML
INJECTION INTRAMUSCULAR; INTRAVENOUS PRN
Status: DISCONTINUED | OUTPATIENT
Start: 2022-08-18 | End: 2022-08-18 | Stop reason: ALTCHOICE

## 2022-08-18 RX ORDER — FENTANYL CITRATE 50 UG/ML
INJECTION, SOLUTION INTRAMUSCULAR; INTRAVENOUS PRN
Status: DISCONTINUED | OUTPATIENT
Start: 2022-08-18 | End: 2022-08-18 | Stop reason: ALTCHOICE

## 2022-08-18 RX ADMIN — SODIUM CHLORIDE: 9 INJECTION, SOLUTION INTRAVENOUS at 08:45

## 2022-08-18 RX ADMIN — Medication 10 ML: at 08:47

## 2022-08-18 ASSESSMENT — PAIN DESCRIPTION - PAIN TYPE: TYPE: OTHER (COMMENT)

## 2022-08-18 ASSESSMENT — PAIN DESCRIPTION - FREQUENCY: FREQUENCY: OTHER (COMMENT)

## 2022-08-18 ASSESSMENT — PAIN DESCRIPTION - ONSET: ONSET: OTHER (COMMENT)

## 2022-08-18 ASSESSMENT — PAIN - FUNCTIONAL ASSESSMENT: PAIN_FUNCTIONAL_ASSESSMENT: NONE - DENIES PAIN

## 2022-08-18 ASSESSMENT — PAIN SCALES - GENERAL: PAINLEVEL_OUTOF10: 0

## 2022-08-18 ASSESSMENT — PAIN DESCRIPTION - LOCATION: LOCATION: OTHER (COMMENT)

## 2022-08-18 ASSESSMENT — PAIN DESCRIPTION - ORIENTATION: ORIENTATION: OTHER (COMMENT)

## 2022-08-18 ASSESSMENT — PAIN DESCRIPTION - DESCRIPTORS: DESCRIPTORS: OTHER (COMMENT)

## 2022-08-18 NOTE — OP NOTE
[address]                 [address] [facility], [place]                                OPERATIVE REPORT    PATIENT NAME: Btesy Orourke                 :        1940  MED REC NO:   [id number]                          ROOM:         ACCOUNT NO:   [de-identified]                           ADMIT DATE: 2022  PROVIDER:     Viky Noriega MD    DATE OF PROCEDURE:  2022    PREPROCEDURE DIAGNOSES:  1. Weight loss. 2.  Dysphagia. 3.  acute kidney injury. PROCEDURE:  EGD with biopsy. POSTPROCEDURE DIAGNOSES:  1. A single duodenal ulcer. 2.  Portal hypertensive gastropathy. 3.  Small hiatal hernia. 4.  No esophageal stricture or stenosis. PROCEDURE INDICATIONS:  This 71-year-old male with history of coronary  artery disease status post stent, severe ischemic cardiomyopathy,  congestive heart failure, gout and hypertension presents to the  emergency room with complaints of fatigue, weakness and failure to  thrive. He complains of dysphagia and has lost a significant amount of  weight in the past 6 months. His symptoms have been progressively  worsening over the past 6 weeks. He has previously declined EGD, but is  now amenable for further workup. He was noted to have acute kidney  injury during ER evaluation. He was also noted to have elevated LFTs. LABORATORY DATA:  White blood cell count 6.6, hemoglobin 15.8,  hematocrit 47.6, platelets 36. Sodium 145, potassium 4.8, chloride 106,  bicarb 18, BUN 21, creatinine 1.7, glucose 75. Liver profile, alk phos  146, , , total protein 6.6, albumin 3.7, total bilirubin  4.3. CT scan of the chest, abdomen and pelvis:  1. No acute process. 2.  No convincing evidence of malignancy. 3.  Small right and trace left pleural effusion. 4.  Mild nonspecific pulmonary fibrosis. 5.  Few mild enlarged mediastinal lymph nodes, likely reactive. 6.  Cholelithiasis.   7. Bladder calculus. MEDICATIONS:  Versed 0.5 mg, fentanyl 12.5 mcg. PROCEDURE DETAILS:  Informed consent obtained after discussing risks,  benefits, alternatives. Full history and physical was performed. The  patient was classified as ASA class III. Medications were sequentially  given to achieve adequate sedation. Cardiopulmonary status was  continuously monitored throughout the procedure. The patient was placed  in left lateral decubitus position. Once adequately sedated, a standard  upper gastroscope was inserted in the mouth and advanced under direct  visualization to second portion of the duodenum. The entire mucosa of  the esophagus, stomach (retroflexed and forward views), duodenum (bulb,  sweep and second portion) were examined carefully during withdrawal.   The patient tolerated the procedure well without any difficulties. FINDINGS:  ESOPHAGUS:  The entire esophagus appeared normal.  There was no evidence  of inflammation, ulcers, strictures or Jones's. There was evidence of  a small 2-cm hiatal hernia from 36 to 38 cm from entry. STOMACH:  There was evidence of moderate portal hypertensive gastropathy  in the proximal stomach. There was mild gastritis in the antrum  characterized by erythema and granularity. Biopsies were taken. Retroflexed view of the stomach was normal.    DUODENUM:  There is a single 5-mm clean-based ulcer in the duodenal  bulb. Second portion of the duodenum appeared normal.    SUMMARY:  1.  Mild gastritis. 2.  Portal hypertensive gastropathy. 3.  Single duodenal ulcer. 4.  Normal esophagus without any strictures or stenosis. 5.  No explanation for dysphagia on this exam.    RECOMMENDATIONS:  1. Return the patient to floor for continuous medical care. 2.  PPI b.i.d. for 2 months. 3.  Await pathology results. 4.  Speech Therapy consultation. 5.  The patient may benefit from modified barium swallow study to assess  for oropharyngeal dysphagia.   6. Elevated LFTs, most likely secondary to passive congestion. We will  proceed with further workup with liver serologies. 7.  Check right upper quadrant ultrasound. 8.  Outpatient colonoscopy once acute issues resolved as part of weight  loss workup.     EBL: <5mL    Liana Dickens MD    D: 08/18/2022 12:32:33       T: 08/18/2022 12:35:45     [email or web address]  Job#: [id number]     Doc#: [id number]    CC:  MD Narcisa Louis MD

## 2022-08-18 NOTE — ACP (ADVANCE CARE PLANNING)
Pt just coming from procedure. Daughter in room. Asked writer to come back tomorrow 8/19/22. Spiritual care will follow up.

## 2022-08-18 NOTE — CONSULTS
Palliative Care Initial Note  Palliative Care Admit date:08/18/2022  ACP/Palliative Care this date.     Alyse Interiano RN Palliative Care

## 2022-08-18 NOTE — PROGRESS NOTES
IM Progress Note    Admit Date:  8/17/2022    81 y.o. male with ischemic cardiomyopathy/Chronic combined CHF, CAD, Gout, and hypertension who presents to [facility] with c/o fatigue and weakness  He has had significant weight loss, almost 70 pounds in the last 6 months. Admitted for CRUZITO, lactic acidosis, dysphagia    Code status discussed with patient. He is a DNR-CC      S/P EGD today     Subjective:  Mr. Rohan Nascimento is back from procedure. Endoscopy showed duodenal ulcer, no significant strictures. Patient is tolerating liquids now. Spoke to patient's daughter at bedside. Patient hydrated overnight and looks more awake and alert now. Patient remains well oriented. Still very weak. Objective:   Patient Vitals for the past 4 hrs:   BP Temp Temp src Pulse Resp SpO2   08/18/22 1345 103/70 97 °F (36.1 °C) Axillary 80 16 99 %   08/18/22 1300 102/77 -- -- 88 -- 100 %   08/18/22 1234 107/74 97.3 °F (36.3 °C) Temporal 89 13 99 %   08/18/22 1145 104/68 97.3 °F (36.3 °C) Temporal 94 20 95 %          Intake/Output Summary (Last 24 hours) at 8/18/2022 1422  Last data filed at 8/18/2022 1221  Gross per 24 hour   Intake 0 ml   Output 200 ml   Net -200 ml       Physical Exam:    Gen: Chronically ill-appearing, frail, elderly male. He is awake alert and oriented. Looks very fatigued  Eyes: PERRL. No sclera icterus. No conjunctival injection. ENT: No discharge. Pharynx clear. Neck: No JVD. No Carotid Bruit. Trachea midline. Resp: No accessory muscle use. No crackles. No wheezes. No rhonchi. CV: Regular rate. Regular rhythm. No murmur. No rub. trace edema. GI: Non-tender. Non-distended. Normal bowel sounds. No hernia. Skin: Warm and dry. No nodule on exposed extremities. No rash on exposed extremities. M/S: No cyanosis. No joint deformity. No clubbing. Neuro: Awake. Grossly nonfocal    Psych: Oriented x 3. No anxiety or agitation.        Scheduled Meds:   sodium chloride flush  5-40 mL IntraVENous 2 times per day       Continuous Infusions:   sodium chloride      sodium chloride 75 mL/hr at 08/18/22 0845       PRN Meds:  sodium chloride flush, sodium chloride, ondansetron **OR** ondansetron, polyethylene glycol, acetaminophen **OR** acetaminophen      Data:  CBC:   Recent Labs     08/17/22  1111 08/18/22  0323   WBC 6.7 6.6   HGB 15.5 15.8   HCT 47.0 47.6   .5* 108.6*   PLT 51* 36*     BMP:   Recent Labs     08/17/22  1111 08/18/22  0323    145   K 4.7 4.8    106   CO2 21 18*   * 121*   CREATININE 1.8* 1.7*     LIVER PROFILE:   Recent Labs     08/17/22  1111 08/18/22  0323   * 240*   * 289*   LIPASE 44.0  --    BILITOT 4.1* 4.3*   ALKPHOS 159* 146*       CULTURES  Covid and flu not detected  Blood cx x 2 pending     RADIOLOGY  CT HEAD WO CONTRAST   Final Result      1. No evidence of acute intracranial process. 2.  Findings of presumed small vessel ischemic deep white matter disease. CT SOFT TISSUE NECK WO CONTRAST   Final Result   No acute abnormality of the soft tissue structures of the neck visualized. Incidental findings as detailed above. CT CHEST ABDOMEN PELVIS WO CONTRAST   Final Result   1. No acute process in the chest, abdomen or pelvis. 2. No convincing evidence of malignancy. 3. Small right and trace left pleural effusions. 4. Mild nonspecific pulmonary fibrosis. 5. Few mildly enlarged mediastinal lymph nodes, nonspecific but favored to be   reactive. 6. Few solid pulmonary nodules measuring up to 5 mm. If the patient is low   risk for lung cancer, no follow-up is advised. If high risk, optional   follow-up chest CT in 12 months per Fleischner criteria. The   7. Cholelithiasis. 8. Punctate layering bladder calculus. XR CHEST PORTABLE   Final Result   Mild pulmonary vascular congestion. US GALLBLADDER RUQ    (Results Pending)        ECHO 8/14/19  - Left ventricle: The cavity size is moderately dilated.  Aliene Range thickness is normal. Systolic function was moderately reduced. The estimated ejection fraction was in the range of 30% to 35%. Moderate diffuse hypokinesis. Doppler parameters are consistent     with a restrictive pattern, indicative of decreased left     ventricular diastolic compliance and increased left atrial     pressure (grade 3 diastolic dysfunction). The global longitudinal     strain was -10%. - Aortic valve: There was mild regurgitation.   - Mitral valve: There was moderate regurgitation, directed     eccentrically and toward the free wall. - Left atrium: The atrium is mildly dilated. - Right atrium: The atrium is mildly dilated. - Inferior vena cava: The vessel was normal in size; the     respirophasic diameter changes were in the normal range (&gt;= 50%);     findings are consistent with normal central venous pressure. Assessment/Plan:    Chart reviewed; plan of care discussed in detail with patient's PCP, and the patient's daughter at bedside. Palliative care consulted. #CRUZITO  -previous creatinine values 0.7-1; previous BUN values of 31  -creatinine on admission 1.8, with significantly elevated   -likely due to poor PO intake, dysphagia  -Hydrated with IVF's. Monitor BMP. BUN/121 with creatinine of 1.7 today. Rule out GI bleed source     #Lactic acidosis  -IVF's, trend Lactic  -persistently elevated today    #Dysphagia  -trouble swallowing, eating  -chokes on water  -SLP eval pending   -GI Consult. Patient agreeable to EGD-completed today. Showed portal gastropathy and duodenal ulcer. No significant esophageal stricture or stenosis. Cont PPI. Biopsy sent. #Elevated LFT's  #Hyperbilirubinemia  -Trend LFT's  -repeat stable  -GI consulted  - check RUQ USG    #Significant weight loss  -Patient cachectic  -CT scan of chest abdomen and pelvis done on admission did not show any acute pathology. No masses noted on EGD . Patient needs colonoscopy.   Can be

## 2022-08-18 NOTE — PROGRESS NOTES
Comprehensive Nutrition Assessment    Type and Reason for Visit:  Initial, Consult, Positive Nutrition Screen (consult for unintentional weight loss; + screen for MST = 3)    Nutrition Recommendations/Plan:   Continue ADULT DIET; Dysphagia - Soft and Bite Sized diet order - consistency changes, per SLP. Added magic cups with meals. Monitor appetite, meal intake, and acceptance/intake of ONS. Monitor nutrition-related labs, bowel function, and weight trends. Malnutrition Assessment:  Malnutrition Status:  Severe malnutrition (08/18/22 1524)    Context:  Chronic Illness     Findings of the 6 clinical characteristics of malnutrition:  Energy Intake:  75% or less estimated energy requirements for 1 month or longer  Weight Loss:  Greater than 20% over 1 year (- 76# or 37.4% weight loss since 10/26/21)     Body Fat Loss:  Severe body fat loss Orbital, Triceps   Muscle Mass Loss:  Severe muscle mass loss Temples (temporalis), Clavicles (pectoralis & deltoids), Scapula (trapezius)  Fluid Accumulation:  No significant fluid accumulation     Strength:  Not Performed    Nutrition Assessment:    patient is severely malnourished AEB poor po intake and significant weight loss PTA and he is at risk for further compromise d/t NPO status, underweight status, and severe fat loss and muscle wasting; will continue ADULT DIET;  Dysphagia - Soft and Bite Sized diet order and add magic cups with meals    Nutrition Related Findings:    patient is A & O; his voice is very weak and yesterday, his speech was jumbled; patient is from home where he lives in a condo with his spouse and he is her caregiver; patient has had poor po intake for a significant period of time; + EGD with biopsy for dysphagia today; patient's diet was advanced this afternoon; NS is infusing at 75 ml/hr currently Wound Type: None       Current Nutrition Intake & Therapies:    Average Meal Intake: Unable to assess (diet advanced from NPO to Soft and Bite Sized this afternoon post-EGD)  Average Supplements Intake: None Ordered  ADULT DIET; Dysphagia - Soft and Bite Sized  ADULT ORAL NUTRITION SUPPLEMENT; Breakfast, Lunch, Dinner; Frozen Oral Supplement    Anthropometric Measures:  Height: 5' 11\" (180.3 cm)  Ideal Body Weight (IBW): 172 lbs (78 kg)    Admission Body Weight: 127 lb 1.6 oz (57.7 kg) (obtained on 8/17/22; actual weight)  Current Body Weight: 127 lb 1.6 oz (57.7 kg) (obtained on 8/17/22; actual weight), 73.9 % IBW.  Weight Source: Bed Scale  Current BMI (kg/m2): 17.7  Usual Body Weight: 203 lb 1 oz (92.1 kg) (obtained 10/26/21-10/28/21 during admission at [facility]; actual weight)  % Weight Change (Calculated): -37.4  Weight Adjustment For: No Adjustment                 BMI Categories: Underweight (BMI less than 22) age over 72    Estimated Daily Nutrient Needs:  Energy Requirements Based On: Kcal/kg  Weight Used for Energy Requirements: Current  Energy (kcal/day): 1740 - 1856 kcals based on 30-32 kcals/kg/CBW  Weight Used for Protein Requirements: Current  Protein (g/day): 87 - 99 g protein based on 1.5-1.7 g/kg/CBW  Method Used for Fluid Requirements: 1 ml/kcal  Fluid (ml/day): 1740 - 1856 ml    Nutrition Diagnosis:   Severe malnutrition related to inadequate protein-energy intake, altered GI function, swallowing difficulty as evidenced by poor intake prior to admission, BMI, severe loss of subcutaneous fat, severe muscle loss, weight loss greater than or equal to 20% in 1 year, swallow study results, lab values    Nutrition Interventions:   Food and/or Nutrient Delivery: Continue Current Diet, Start Oral Nutrition Supplement  Nutrition Education/Counseling: No recommendation at this time  Coordination of Nutrition Care: Continue to monitor while inpatient, Coordination of Care, Swallow Evaluation, Speech Therapy       Goals:  Previous Goal Met:  (N/A)  Goals: PO intake 50% or greater, by next RD assessment       Nutrition Monitoring and Evaluation:

## 2022-08-18 NOTE — H&P
History and Physical / Pre-Sedation Assessment    Patient:  Dr. Vladislav Chavez Mews:   1940     Intended Procedure:  EGD    HPI: 81year old male with history of HTN, CAD s/p stent, COPD, CHF, gout admitted with failure to thrive    Past Medical History:   Diagnosis Date    Chronic combined systolic and diastolic CHF (congestive heart failure) ([address]     Coronary artery disease involving native coronary artery of native heart without angina pectoris     Gout     Hypertension     Ischemic cardiomyopathy      Past Surgical History:   Procedure Laterality Date    CORONARY ANGIOPLASTY         Medications reviewed  Prior to Admission medications    Medication Sig Start Date End Date Taking? Authorizing Provider   colchicine (MITIGARE) 0.6 MG capsule Take 1 capsule by mouth 2 times daily 3/18/22   Aron Roberson MD   furosemide (LASIX) 40 MG tablet Take 1 tablet by mouth daily as needed (edema) 2/25/22   Aron Roberson MD   febuxostat (ULORIC) 40 MG TABS tablet Take 1 tablet by mouth daily 10/28/21   Aron Roberson MD        Allergies: Allergies   Allergen Reactions    Allopurinol Anaphylaxis, Itching and Rash    Prednisone Anxiety     Became \"wired\" per pt       Nurses notes reviewed and agreed. Physical Exam:  Vital Signs: /68   Pulse 94   Temp 97.3 °F (36.3 °C) (Temporal)   Resp 20   Ht 5' 11\" (1.803 m)   Wt 127 lb 1.6 oz (57.7 kg)   SpO2 95%   BMI 17.73 kg/m²    Airway: Mallampati: II (soft palate, uvula, fauces visible)  Pulmonary:Normal  Cardiac:Normal  Abdomen:Normal    Pre-Procedure Assessment / Plan:  ASA: Class 3 - A patient with severe systemic disease that limits activity but is not incapacitating  Level of Sedation Plan: Moderate sedation  Post Procedure plan: Return to same level of care    I assessed the patient and find that the patient is in satisfactory condition to proceed with the planned procedure and sedation plan.     I have explained the risk, benefits, and alternatives to the procedure; the patient understands and agrees to proceed.        Jenn Clayton MD  8/18/2022

## 2022-08-18 NOTE — ACP (ADVANCE CARE PLANNING)
Advance Care Planning   The patient has the following advanced directives on file:  Advance Directives       Power of [address] ACP-Advance Directive ACP-Power of     Not on File Not on File Not on File Not on File            The patient has appointed the following active healthcare agents:    Primary Decision Maker: Juni Salcedo Child - [phone]    The Patient has the following current code status:    Code Status: DNR-CC    Visit Documentation:  I discussed Advance Care Planning with Yue Collazo Dr. today which included the importance of making their choices for care and treatment in the case of a health event that adversely affects their decision-making abilities. He has not completed the Advance Care Directives. He does not have an active health care agent at this time. 1. Goals of medical treatment were reviewed . 2. Patient's stated goal/treatment preference is home with home healthcare. 3. Others present during the discussion daughters   4. The discussion lasted 60 minutes. Palliative Care Initial Note  Palliative Care Admit date: 08/18/2022    Advance Directives: DNR-CC    Plan of care/goals:Reviewed chart. met with the pt and pt's daughters,Feli at bedside. Pt's daughters are both educated on hospice from losing close family members in the past. Hospice list provided to the family. Pt is not interested in pursuing hospice at this time. Pt is still awaiting speech evaluation, echo, and PT/OT evaluation. Pt is hopeful to attend grandson's wedding tomorrow,however, may not be strong enough to attend the ceremony. Pt agreeable to home health care for SN,PT/OT at d/c,however, declining Skilled nursing facility at this time. Pt's spouse has Alzheimer's disease and he has been her caregiver along with a lot of family helping as well. Family is very supportive and nearby.      Social/Spiritual: Prayer Support    Plan: Home with Home healthcare    Reason for

## 2022-08-18 NOTE — BRIEF OP NOTE
Brief Postoperative Note      Patient: Diane Rodarte Dr.  YOB: 1940  MRN: [id number]    Date of Procedure: 8/18/2022    Pre-Op Diagnosis: DYSPHAGIA    Post-Op Diagnosis:  portal HTN gastropathy, single duodenal ulcer, small hiatal hernia but no stricture or stenosis       Procedure(s):  EGD BIOPSY    Surgeon(s):  Onur Hart MD    Assistant:  * No surgical staff found *    Anesthesia: IV Sedation    Estimated Blood Loss (mL): Minimal    Complications: None    Specimens:   ID Type Source Tests Collected by Time Destination   A :  Tissue Biopsy SURGICAL PATHOLOGY Onur Hart MD 8/18/2022 1220        Implants:  * No implants in log *      Drains: * No LDAs found *    Findings: portal HTN gastropathy, single duodenal ulcer, small hiatal hernia but no stricture or stenosis    Recommendation  Continue supportive care  PPI BID x 2m  Await pathology  Speech therapy  May need MBS  Liver serology workup and RUQ US    Electronically signed by Onur Hart MD on 8/18/2022 at 12:23 PM

## 2022-08-18 NOTE — PROGRESS NOTES
Bedside report and transfer of care given to [facility]. Pt currently resting in bed with the call light within reach. Pt denies any other care needs at this time. Pt stable at this time.

## 2022-08-18 NOTE — ACP (ADVANCE CARE PLANNING)
Advance Care Planning     General Advance Care Planning (ACP) Conversation    Date of Conversation: 8/17/2022  Conducted with: Patient with Decision Making Capacity    Healthcare Decision Maker:    Primary Decision Maker: Elissa Ramos - Child - [phone]  Click here to complete Healthcare Decision Makers including selection of the Healthcare Decision Maker Relationship (ie \"Primary\"). Today we documented Decision Maker(s) consistent with Legal Next of Kin hierarchy.     Content/Action Overview:    Reviewed DNR/DNI and patient confirms current DNR status - completed forms on file (place new order if needed)    Length of Voluntary ACP Conversation in minutes:  <16 minutes (Non-Billable)    Lesley Braswell, RN

## 2022-08-18 NOTE — PROGRESS NOTES
Speech Language Pathology  SLP Attempt Note        Name: Dr. Ben Puri: 1940  Medical Diagnosis: CRUZITO (acute kidney injury) [facility] [N17.9]    Attempting to see pt for dysphagia eval. Per RN, the patient is supposed to have EGD today. He will need to remain NPO for that this will hold swallow eval. Will re-attempt at later time pending schedule and pt availability. No charges filed.  Thank you,    Jesus Terrell M.A., [address] [place] [address]  Speech-Language Pathologist  Phone: [phone]

## 2022-08-18 NOTE — PROGRESS NOTES
Spoke with Javi Abraham from [facility] regarding transport to [facility] to the cath lab  time is 12:30 /13:00 8-18-22. Javi Abraham will call if an earlier time comes available.  Abby Valderrama 8-18-22 8:42

## 2022-08-18 NOTE — PROGRESS NOTES
Consent for EGD obtained and placed in soft chart. Patient requested writer update daughter Catrachita Jacques. Writer provided update as requested.

## 2022-08-18 NOTE — CARE COORDINATION
Case Management Assessment  Initial Evaluation      Patient Name: Brigitte Corimer Dr.  YOB: 1940  Diagnosis: CRUZITO (acute kidney injury) [facility]) [N17.9]  Date / Time: 8/17/2022 10:37 AM    Admission status/Date:8/17/2022  Chart Reviewed: Yes      Patient Interviewed: Yes   Family Interviewed:  No      Hospitalization in the last 30 days:  No      Health Care Decision Maker :   Primary Decision Maker: MAGO HERNANDEZ - Child - [phone]    (CM - must 1st enter selection under Navigator - emergency contact- Health Care Decision Maker Relationship and pick relationship)   Who do you trust or have selected to make healthcare decisions for you      Met with: pt  Interview conducted  (bedside/phone):bedside    Current PCP:   Tsering Aguilar MD      Financial  Commercial  Precert required for SNF : Y          3 night stay required - N    ADLS  Support Systems/Care Needs: Spouse/Significant Other, Children, Family Members, Friends/Neighbors  Transportation: family    Meal Preparation: family    Housing  Living Arrangements: lives in [facility] with his spouse-she has dementia and he is her caregiver  Steps: 0  Intent for return to present living arrangements: Yes  Identified Issues:     [address] with [address] : No Agency:(Services)  Type of Home Care Services: None  Passport/Waiver : No  :                      Phone Number:    Passport/Waiver Services:           Durable Medical Equiptment   DME Provider: none  Equipment:   Walker__x_Cane_x__RTS__x_ BSC___Shower Chair__x_Hospital Bed___W/C_x___Other________  02 at ____Liter(s)---wears(frequency)_______ HHN ___ CPAP___ BiPap___   N/A____      Home O2 Use :  No    If No for home O2---if presently on O2 during hospitalization:  No      Community Service Affiliation  Dialysis:  No      Other Community Services: none    DISCHARGE PLAN: Explained Case Management role/services. Chart reviewed.  Met with pt at bedside and he states he is from [facility] with spouse (he is her caregiver due to dementia)pt plans to return home. Pt has not eaten in 3 weeks, plan for EGD today. Will follow, pt is considering home care if needed.

## 2022-08-19 LAB
A/G RATIO: 1.4 (ref 1.1–2.2)
ALBUMIN SERPL-MCNC: 3.4 G/DL (ref 3.4–5)
ALP BLD-CCNC: 148 U/L (ref 40–129)
ALT SERPL-CCNC: 258 U/L (ref 10–40)
ANION GAP SERPL CALCULATED.3IONS-SCNC: 24 MMOL/L (ref 3–16)
ANISOCYTOSIS: ABNORMAL
ANTI-NUCLEAR ANTIBODY (ANA): NEGATIVE
AST SERPL-CCNC: 190 U/L (ref 15–37)
BASOPHILS ABSOLUTE: 0 K/UL (ref 0–0.2)
BASOPHILS RELATIVE PERCENT: 0.4 %
BILIRUB SERPL-MCNC: 4 MG/DL (ref 0–1)
BUN BLDV-MCNC: 118 MG/DL (ref 7–20)
CALCIUM SERPL-MCNC: 9.1 MG/DL (ref 8.3–10.6)
CHLORIDE BLD-SCNC: 107 MMOL/L (ref 99–110)
CO2: 11 MMOL/L (ref 21–32)
CREAT SERPL-MCNC: 1.6 MG/DL (ref 0.8–1.3)
EOSINOPHILS ABSOLUTE: 0 K/UL (ref 0–0.6)
EOSINOPHILS RELATIVE PERCENT: 0.1 %
FERRITIN: 2192 NG/ML (ref 30–400)
GFR AFRICAN AMERICAN: 50
GFR NON-AFRICAN AMERICAN: 42
GLUCOSE BLD-MCNC: 160 MG/DL (ref 70–99)
HAV IGM SER IA-ACNC: NORMAL
HCT VFR BLD CALC: 47.5 % (ref 40.5–52.5)
HEMATOLOGY PATH CONSULT: NORMAL
HEMATOLOGY PATH CONSULT: YES
HEMOGLOBIN: 15.2 G/DL (ref 13.5–17.5)
HEPATITIS B CORE IGM ANTIBODY: NORMAL
HEPATITIS B SURFACE ANTIGEN INTERPRETATION: NORMAL
HEPATITIS C ANTIBODY INTERPRETATION: NORMAL
IRON SATURATION: ABNORMAL % (ref 20–50)
IRON: 185 UG/DL (ref 59–158)
LV EF: 23 %
LVEF MODALITY: NORMAL
LYMPHOCYTES ABSOLUTE: 0.5 K/UL (ref 1–5.1)
LYMPHOCYTES RELATIVE PERCENT: 9.2 %
MCH RBC QN AUTO: 36 PG (ref 26–34)
MCHC RBC AUTO-ENTMCNC: 32 G/DL (ref 31–36)
MCV RBC AUTO: 112.5 FL (ref 80–100)
MONOCYTES ABSOLUTE: 0.5 K/UL (ref 0–1.3)
MONOCYTES RELATIVE PERCENT: 8.5 %
NEUTROPHILS ABSOLUTE: 4.8 K/UL (ref 1.7–7.7)
NEUTROPHILS RELATIVE PERCENT: 81.8 %
PDW BLD-RTO: 18.4 % (ref 12.4–15.4)
PLATELET # BLD: 34 K/UL (ref 135–450)
PLATELET SLIDE REVIEW: ABNORMAL
PMV BLD AUTO: 10.3 FL (ref 5–10.5)
POIKILOCYTES: ABNORMAL
POLYCHROMASIA: ABNORMAL
POTASSIUM REFLEX MAGNESIUM: 4.8 MMOL/L (ref 3.5–5.1)
RBC # BLD: 4.22 M/UL (ref 4.2–5.9)
SLIDE REVIEW: ABNORMAL
SODIUM BLD-SCNC: 142 MMOL/L (ref 136–145)
TOTAL IRON BINDING CAPACITY: ABNORMAL UG/DL (ref 260–445)
TOTAL PROTEIN: 5.8 G/DL (ref 6.4–8.2)
WBC # BLD: 5.9 K/UL (ref 4–11)

## 2022-08-19 PROCEDURE — 2060000000 HC ICU INTERMEDIATE R&B

## 2022-08-19 PROCEDURE — 2580000003 HC RX 258: Performed by: INTERNAL MEDICINE

## 2022-08-19 PROCEDURE — 97166 OT EVAL MOD COMPLEX 45 MIN: CPT

## 2022-08-19 PROCEDURE — 97530 THERAPEUTIC ACTIVITIES: CPT

## 2022-08-19 PROCEDURE — 80053 COMPREHEN METABOLIC PANEL: CPT

## 2022-08-19 PROCEDURE — 93308 TTE F-UP OR LMTD: CPT

## 2022-08-19 PROCEDURE — 85025 COMPLETE CBC W/AUTO DIFF WBC: CPT

## 2022-08-19 PROCEDURE — 97162 PT EVAL MOD COMPLEX 30 MIN: CPT

## 2022-08-19 PROCEDURE — 97535 SELF CARE MNGMENT TRAINING: CPT

## 2022-08-19 PROCEDURE — 92610 EVALUATE SWALLOWING FUNCTION: CPT

## 2022-08-19 PROCEDURE — 6370000000 HC RX 637 (ALT 250 FOR IP): Performed by: NURSE PRACTITIONER

## 2022-08-19 PROCEDURE — 99232 SBSQ HOSP IP/OBS MODERATE 35: CPT | Performed by: INTERNAL MEDICINE

## 2022-08-19 PROCEDURE — 92526 ORAL FUNCTION THERAPY: CPT

## 2022-08-19 PROCEDURE — 97110 THERAPEUTIC EXERCISES: CPT

## 2022-08-19 PROCEDURE — 6370000000 HC RX 637 (ALT 250 FOR IP): Performed by: INTERNAL MEDICINE

## 2022-08-19 RX ORDER — PANTOPRAZOLE SODIUM 40 MG/1
40 TABLET, DELAYED RELEASE ORAL
Status: DISCONTINUED | OUTPATIENT
Start: 2022-08-19 | End: 2022-08-20 | Stop reason: HOSPADM

## 2022-08-19 RX ORDER — PANTOPRAZOLE SODIUM 40 MG/10ML
40 INJECTION, POWDER, LYOPHILIZED, FOR SOLUTION INTRAVENOUS 2 TIMES DAILY
Status: DISCONTINUED | OUTPATIENT
Start: 2022-08-19 | End: 2022-08-19

## 2022-08-19 RX ORDER — PANTOPRAZOLE SODIUM 40 MG/10ML
40 INJECTION, POWDER, LYOPHILIZED, FOR SOLUTION INTRAVENOUS DAILY
Status: DISCONTINUED | OUTPATIENT
Start: 2022-08-19 | End: 2022-08-19

## 2022-08-19 RX ADMIN — PANTOPRAZOLE SODIUM 40 MG: 40 TABLET, DELAYED RELEASE ORAL at 10:37

## 2022-08-19 RX ADMIN — SODIUM CHLORIDE: 9 INJECTION, SOLUTION INTRAVENOUS at 13:55

## 2022-08-19 RX ADMIN — ONDANSETRON 4 MG: 4 TABLET, ORALLY DISINTEGRATING ORAL at 10:46

## 2022-08-19 NOTE — PROGRESS NOTES
Bedside report and transfer of care given to Anson Leslie [facility]. Pt currently resting in bed with the call light within reach. Pt denies any other care needs at this time. Pt stable at this time.

## 2022-08-19 NOTE — PROGRESS NOTES
Writer and transport at bedside to transfer pt to stretcher for modified barium test. Patient refused and states \"I am not doing it, not right now\". Writer explained  the importance of the test and patient states he was not doing it and he is done. Update:   Writer called daughter Aiden Sanchez and provided an update.

## 2022-08-19 NOTE — PROGRESS NOTES
artery disease involving native coronary artery of native heart without angina pectoris 11/19/2021    Chronic systolic congestive heart failure ([address] 11/19/2021    Chronic gout without tophus 11/19/2021    Ataxia 11/19/2021    Inability to walk 10/28/2021    Ambulatory dysfunction 10/27/2021    Acute pain of both knees     Generalized weakness     Leukocytosis      Past Medical History:   Diagnosis Date    Chronic combined systolic and diastolic CHF (congestive heart failure) ([address]     Coronary artery disease involving native coronary artery of native heart without angina pectoris     Gout     Hypertension     Ischemic cardiomyopathy      Past Surgical History:   Procedure Laterality Date    CORONARY ANGIOPLASTY      UPPER GASTROINTESTINAL ENDOSCOPY N/A 8/18/2022    EGD BIOPSY performed by Xi Fox MD at [facility]   Allergen Reactions    Allopurinol Anaphylaxis, Itching and Rash    Prednisone Anxiety     Became \"wired\" per pt       DATE ONSET: 08/17/2022    Date of Evaluation: 8/19/2022   Evaluating Therapist: GAB Montilla    Chart Reviewed: : [x] Yes [] No    Current Diet: ADULT DIET; Dysphagia - Soft and Bite Sized  ADULT ORAL NUTRITION SUPPLEMENT; Breakfast, Lunch, Dinner; Frozen Oral Supplement  ADULT ORAL NUTRITION SUPPLEMENT; Breakfast, Lunch, PM Snack; Standard High Calorie/High Protein Oral Supplement    Recent Chest Radiography: [] Chest XR   [x] CT of Chest  Date: 08/17/2022  Impressions  Impression   1. No acute process in the chest, abdomen or pelvis. 2. No convincing evidence of malignancy. 3. Small right and trace left pleural effusions. 4. Mild nonspecific pulmonary fibrosis. 5. Few mildly enlarged mediastinal lymph nodes, nonspecific but favored to be   reactive. 6. Few solid pulmonary nodules measuring up to 5 mm. If the patient is low   risk for lung cancer, no follow-up is advised.   If high risk, optional   follow-up chest CT in 12 months per Fleischner criteria. The   7. Cholelithiasis. 8. Punctate layering bladder calculus. Pain: The patient does not complain of pain    Reason for Referral  Arlette Ray Dr. was referred for a bedside swallow evaluation to assess the efficiency of their swallow function, identify signs and symptoms of aspiration and make recommendations regarding safe dietary consistencies, effective compensatory strategies, and safe eating environment. Assessment    Medical record review/interview: Per MD H&P: \" The patient is a 81 y.o. male with ischemic cardiomyopathy/Chronic combined CHF, CAD, Gout, and hypertension who presents to [facility] with c/o fatigue and weakness. Patient has had difficulty swallowing and eating for a while. He has had a significant decline in the last 6 weeks. He states yesterday he slid out of his chair and was unable to get up off the floor. He came to the ED today for evaluation. He has not wanted an Endoscopy, but has decided he would be okay if this is needed. He has a grandson getting  on Friday and wants to be there. He has lost a significant amount of weight in the last 6 months. He was 203 lbs on Oct 2021. Now weight is 127 lbs. He is taking in some liquids. Sometimes he gets choked on the liquids. If he eats solids, he feels like it sits there and it doesn't go down. He has shortness of breath at times. Labs with CRUZITO, lactic acidosis, BNP > 70,000, troponin 0.04, elevated LFT's, hyperbilirubinemia, macrocytosis, and thrombocytopenia. Admitted to PCU on tele. GI consulted. \"        Patient Complaints:  Odynophagia: [] Yes [x] No  Globus Sensation: [x] Yes [] No  SOB with PO intake: [x] Yes [] No  Increased WOB with PO intake: [x] Yes [] No  Reflux Sx's: [x] Yes [] No  Weight loss: [x] Yes [] No  Coughing/Choking with PO intake: [x] Yes [] No  Reduced Appetite: [x] Yes [] No  Trouble with Mastication:  [x] Yes [] No  Avoidance of Certain Foods:  [x] Yes [] No  Premature Satiety:  [x] Yes [] No  Recent PNA:  [] Yes [x] No  Recurring PNA:  [] Yes [x] No  Changes in vocal quality: [] Yes [x] No    Baseline Method of Oral Meds:  [x] Whole with liquid    [] Cut with liquid    [] Whole with puree    [] Cut in puree    [] Crushed in puree    [] Crushed in liquid    [] Via TF    Additional Reported Symptoms/Complaints: Pt admitted with trouble swallowing and weight loss. SLP and GI were consulted. I held yesterday as the patient was NPO for EGD. EGD was completed with the following findings: mild gastritis, portal hypertensive gastropathy, single duodenal ulcer, no strictures or stenosis, and no real explanation for his dysphagia. GI recommended SLP consultation and probably MBS. Pt may also benefit from FEES. He reports he feels resistance with swallowing but not necessarily pain. Reports globus sensation in mid and lower pharynx; also in sternum and mid-upper abdomen. Predisposing dysphagia risk factors: CHF, Age  Clinical signs of possible chronic dysphagia: weight loss  Precipitating dysphagia risk factors: reduced physical mobility    Vitals/labs:     Vitals:    08/18/22 1515 08/18/22 1951 08/19/22 0028 08/19/22 0230   BP:  105/74 100/69 99/73   Pulse:  90 85 83   Resp:  16 16 15   Temp:  97 °F (36.1 °C) 97 °F (36.1 °C) 97.2 °F (36.2 °C)   TempSrc:  Axillary Axillary Axillary   SpO2:  99% 99%    Weight:   129 lb 14.4 oz (58.9 kg)    Height: 5' 11\" (1.803 m)           CBC:   Recent Labs     08/19/22  0429   WBC 5.9   HGB 15.2   PLT 34*      BMP:  Recent Labs     08/19/22  0429      K 4.8      CO2 11*   *   CREATININE 1.6*   GLUCOSE 160*          Cranial nerve exam:   CN V (trigeminal): ophthalmic, maxillary, and mandibular facial sensation- WNL b/l  CN VII (facial):  WNL b/l  CN IX/X (glossopharyngeal/vagus): MPT: Reduced; pitch range: WFL; vocal quality: hoarse; cough: Strong-perceptually, Non-Productive, and Dry  CN XII (hypoglossal): WNL b/l      Laryngeal function exam:   Secretions: Oral mucosa pink and dry  Vocal quality: See CN exam above  MPT: See CN exam above  S/Z ratio: DNT  Pitch range: See CN exam above  Cough: See CN exam above    Oral Care Status:    [] Oral Care [facility]  [x] Poor oral care status  [] Edentulous  [] Upper Dentures  [] Lower Dentures  [x] Missing/Broken Teeth  [] Evidence of dental cavities/carries    PO trials:   Ice: WFL with no clinical s/s of aspiration     IDDSI 0 (thin):     - Cup: No initial signs but did have delayed coughing 4/4 trials. OT called me following my eval stating that pt was noted with some delayed coughing and frequent throat clearing with sips of water after eval. Pt did have anterior bolus loss 4/4 trials. - Straw: Improved oral bolus control with no anterior bolus loss noted. Delayed coughing post swallow. IDDSI 4 (puree): no anterior bolus loss , suspect functional A-P bolus transit, swallow timing subjectively appears timely, oral clearance grossly WFL, and no clinical s/s of aspiration. Reported globus sensation post-swallow. IDDSI 7 (regular): no anterior bolus loss , suspect reduced/impaired A-P bolus transit, swallow timing subjectively appears timely, impaired mastication , prolonged mastication, oral stasis noted post swallow, and no clinical s/s of aspiration. C/o globus sensation post swallow. 3 oz water: PASS    Impressions:    Clinical signs of oropharyngeal dysphagia and esophageal dysphagia likely acute-on-chronic related to reduced physical mobility, disuse atrophy, and generalized weakness. Swallow prognosis is fair. Instrumental swallow study is indicated. Given tolerance to PO at bedside, pt is safe for modified oral diet prior to instrumental study    Instrumentation: Yes. MBSS is warranted to further assess oropharyngeal structures and functions. Order placed at this time.   Diet recommendation: IDDSI 6 Soft and Bite Sized Solids; IDDSI 0 Thin Liquids; Meds whole with thin liquids  Risk management: upright for all intake, stay upright for at least 30 mins after intake, small bites/sips, assist feed, oral care q4 hrs to reduce adverse affects in the event of aspiration, increase physical mobility as able, alternate bites/sips, slow rate of intake, general GERD precautions, general aspiration precautions, and hold PO and contact SLP if s/s of aspiration or worsening respiratory status develop. Of note, was called by OT following eval whom reported pt was complaining of globus sensation, coughing, and was constantly clearing throat. Spoke with RN whom reports that pt immediately regurgitated water when taking PO meds after eval. Recommend esophagram in conjunction with MBS. RN to request order from MD. MBS order placed at this time. Additionally, pt reported to OT that he intentionally stopped eating past year because he was afraid his wife would not be able to assist him if down. Psych consult could be considered.   Prognosis: Fair    Recommended Intervention:  [x] Dysphagia tx  [] Videostroboscopy                      [] NPO   [x] MBS       [] Speech/Cog Eval  [x] Therapeutic PO Trials     [] Ice Chips   [] Other:  [] FEES                                                 Dysphagia Therapeutic Intervention:  []  Bolus control Exercises  []  Oral Motor Exercises  []  [employer] Protocol  []  Thermal Stimulation  []  Oral Care     []  Vital Stim/NMES  []  Laryngeal Exercises  [x]  Patient/Family Education  []  Pharyngeal Exercises  [x]  Therapeutic PO trials with SLP  [x]  Diet tolerance monitoring  []  Other:     Referrals:  [] ENT    [] PT  [x] Pulmonology [x] GI  [] Neurology  [x] RD  [] OT   [] Psych    Goals:  Short Term Goals:  Timeframe for Short Term Goals: (5 days 08/24/2022)  Goal 1: The patient will tolerate recommended diet with no clinical s/s of aspiration 5/5  Goal 2: The patient will tolerate therapeutic diet upgrade trials with no clinical s/s of aspiration 5/5  Goal 3: The patient will recall/perform recommended compensatory strategies given min cues  Goal 4: The patient will tolerate instrumental assessment when able     Long Term Goals:   Timeframe for Long Term Goals: (7 days 08/26/2022)  Goal 1: The patient will tolerate least restrictive diet with no clinical s/s of aspiration or worsening respiratory/pulmonary status    Treatment:  Skilled instruction completed with patient re: evidenced based practice regarding recommendations and POC, importance of oral care to reduce adverse affects in the event of aspiration, and instruction of recommended compensatory strategies developed based upon clinical exam. Pt able to recall/demonstrate compensatory strategies with (min, mod, max) cues.       Pt Education: SLP educated the patient re: Role of SLP, rationale for completion of assessment, anatomical components of swallow structures as they pertain to airway protection, results of assessment, recommendations, POC, and rationale for MBS  Pt Education Response: verbalized understanding    Duration/Frequency of Tx: 2-4x/wk    Individuals Consulted:   [x]  Patient     []  NP         [x]  RN   []  RD                   [x]  MD      []  Family Member                        []  PA    []  Other:      Safety Devices / Report:  [x]  All fall risk precautions in place [x]  Safety handoff completed with RN  [x]  Bed alarm in place  [x]  Left in bed     []  Chair alarm in place  []  Left in chair   [x]  Call light in reach   [x]  Other: OT/PT present           Total Treatment Time / Charges       Time in Time out Total Time / units   Swallow Eval/Tx Time         Signature:  Nura Fonseca M.A., [phone]  Speech-Language Pathologist  Phone: [address] [place] [address], Desk- [phone]  Hours: [phone]

## 2022-08-19 NOTE — ACP (ADVANCE CARE PLANNING)
met with patient to discuss [facility] document. However, patient's grandson and great grandson were at bedside visiting patient prior to grandson's wedding this evening. Per grandson, his mother Radha Adames) is patient's healthcare decision maker, but she is unavailable today due to her involvement in grandson's wedding. Writer will ask weekend  to contact Ara on 8/20 and request a copy of patient's HCPOA document.

## 2022-08-19 NOTE — PROGRESS NOTES
Inpatient Physical Therapy Evaluation and Treatment    Unit: PCU  Date:  8/19/2022  Patient Name:    Dr. Nahed Perez diagnosis:  CRUZITO (acute kidney injury) [facility]) [N17.9]  Admit Date:  8/17/2022  Precautions/Restrictions/WB Status/ Lines/ Wounds/ Oxygen: Fall risk, Bed/chair alarm, and Telemetry    Treatment Time:  09:10-10:15  Treatment Number:  1   Timed Code Treatment Minutes: 55 minutes  Total Treatment Minutes:  65  minutes    Patient Goals for Therapy: \" speak clearer and get the sound better \"  ; \"get back to my wife\"      Discharge Recommendations: SNF ; will need reliable 24/7 assist if he chooses to go home. DME needs for discharge: defer to facility. If going home, will benefit from [facility]. Therapy recommendation for EMS Transport: can transport by wheelchair    Therapy recommendations for staff:   Assist of 1 with use of rolling walker (RW) and gait belt for all transfers and ambulation to/from [facility]  to/from chair    History of Present Illness: Per H&P: \"The patient is a 81 y.o. male with ischemic cardiomyopathy/Chronic combined CHF, CAD, Gout, and hypertension who presents to [address] [place] [address] with c/o fatigue and weakness. Patient has had difficulty swallowing and eating for a while. He has had a significant decline in the last 6 weeks. He states yesterday he slid out of his chair and was unable to get up off the floor. He came to the ED today for evaluation. He has not wanted an Endoscopy, but has decided he would be okay if this is needed. He has a grandson getting  on Friday and wants to be there. He has lost a significant amount of weight in the last 6 months. He was 203 lbs on Oct 2021. Now weight is 127 lbs. He is taking in some liquids. Sometimes he gets choked on the liquids. If he eats solids, he feels like it sits there and it doesn't go down. He has shortness of breath at times.        Labs with CRUZITO, lactic acidosis, BNP > 70,000, troponin 0.04, elevated LFT's, hyperbilirubinemia, macrocytosis, and thrombocytopenia. Admitted to PCU on tele. GI consulted. \"    Home Health S4 Level Recommendation:  Level 3 Safety  AM-PAC Mobility Score       AM-PAC Inpatient Mobility without Stair Climbing Raw Score : 14    Preadmission Environment    Pt. Lives with spouse and 24/7 Assist Available   Home environment:    condo  Steps to enter first floor:    1 steps to enter      Steps to second floor: N/A  Bathroom:       Tub/Shower unit, Walk-in Shower, Grab bars, Shower Chair , and comfort height toilet  Equipment owned:      Rollator , small base quad cane (SBQC), manual W/C, and lift chair      Preadmission Status / PLOF:  History of falls             Yes slid out of lift chair prior to this admission, reports one other fall in last 6 mos-lost balance getting out of a chair  Pt. Able to drive          Yes \"only when necessary\"-adult children often drive for patient  Pt Fully independent with ADL's         No-wife has been assisting recently to speed process along, patient moves slowly  Pt. Required assistance from family for:  Bathing, Cleaning, Cooking, Dressing, and Laundry     Pt. Fully independent for transfers and gait and walked with: Rollator, sometimes uses manual w/c in community  Reports consciously trying to stop eating about a year ago to lose weight in an effort to be able to be assisted by wife during aging (\"I weighed 1 and my wife weighed 119. \")  Lately reports realizing weight was \"a little too much\" and then realized it was hard to eat much. Pain   No    Cognition    A&O x4   Able to follow 2 step commands    Subjective  Patient lying supine in bed with no family present. Pt agreeable to this PT eval & tx. Upper Extremity ROM/Strength  Please see OT evaluation. Lower Extremity ROM / Strength   AROM WFL: Yes    BLE strength impaired, but not formally assessed with MMT. Grossly 3+/5 based on demonstrated functional mobility.  No unilateral deficits noted. Lower Extremity Sensation    WFL    Lower Extremity Proprioception:   WFL    Coordination and Tone  WFL    Balance  Sitting:  Good ; Supervision  Comments: sits in mildly flexed posture. Standing: Fair +; CGA  Comments: with walker support. Bed Mobility   Supine to Sit:    Min A - from maximally elevated HOB. Assist needed for trunk propping from sidelying position. Sit to Supine:   Not Tested   (up to chair to end session)  Rolling:   Supervision with use of bedrail to pull on  Scooting in sitting: Supervision  Scooting in supine:  Not Tested    Transfer Training     Sit to stand:   Min A from EOB during 1st trial     CGA from EOB during 2nd trial  Stand to sit:   SBA  Bed to Chair:   CGA with use of gait belt and rolling walker (RW)    Gait gait deferred due to dizziness at EOB; pt ambulated 0 ft. Stair Training deferred, pt unsafe/ not appropriate to complete stairs at this time    Activity Tolerance   Pt completed therapy session with No adverse symptoms noted w/activity  Supine at 8AM (1.5 hr ago): BP=97/69  After standing 1 minute: Pt c/o mild dizziness. BP=85/64  After bed>chair transfer: Still dizzy. BP=95/66    Positioning Needs   Pt up in chair, alarm set, positioned in proper neutral alignment and pressure relief provided. Call light provided and all needs within reach    Exercises Initiated  all completed bilaterally unless indicated and completed in reclined position  Ankle Pumps x 20 reps  Quad sets x 20 reps  Heel slides x 10 reps (AAROM)  Hip abduction: x 5 reps  Pt falling asleep during above exercises. Other  None. Patient/Family Education   Pt educated on role of inpatient PT, POC, importance of continued activity, DC recommendations, transfer techniques, HEP, and calling for assist with mobility. Assessment  Pt seen for Physical Therapy evaluation in acute care setting.   Pt demonstrated decreased Activity tolerance, Balance, Safety, and Strength as well as decreased independence with Ambulation, Bed Mobility , and Transfers. Pt generally moves very slowly through all functional tasks. He needs Min A for supine>sit, sit>stand, and bed>chair pivot transfer. He tolerates about a minute of sustained standing activity and becomes increasingly dizzy with activity, which he reports as being common recently. Per chart review pt intends to return home with family support. He will benefit from skilled PT to promote functional strength for improved independence and safety at home. When asked during this evaluation about SNF, he stated \"yesterday I would have said no, but today, maybe\". Based on his generalized weakness, need for physical assist, and dizziness with activity, he would benefit from SNF level of care. If he choses to go home he will need reliable 24/7 assist and home PT. Goals : To be met in 3 visits:  1). Independent with LE Ex x 10 reps    To be met in 6 visits:  1). Supine to/from sit: Independent  2). Sit to/from stand: Supervision  3). Bed to chair: Supervision  4). Gait: Ambulate 50 ft.  with  Supervision and use of LRAD (least restrictive assistive device)  5). Tolerate B LE exercises 3 sets of 10-15 reps  6). Ascend/descend 1 steps with CGA with use of hand rail unilateral and LRAD (least restrictive assistive device)    Rehabilitation Potential: Fair  Strengths for achieving goals include:   Family Support and Pt cooperative   Barriers to achieving goals include:    Weakness    Plan    To be seen 3-5 x / week  while in acute care setting for therapeutic exercises, bed mobility, transfers, progressive gait training, balance training, and family/patient education. Signature: Williams Friday, PT, DPT    If patient discharges from this facility prior to next visit, this note will serve as the Discharge Summary.

## 2022-08-19 NOTE — PROGRESS NOTES
Patient is alert and oriented to person, place, time, and situation. Patient removed IV, refuses to have another IV placed. States that they are \"tired of the beeps and just want to sleep\". Patient educated on importance of IV access and fluids, no success. Full set of vitals taken at this time. BP 99/73, HR 83, SpO2 99% on RA, Temp 97.4 F. Dr. Ana Dumont notified. Will continue to monitor the patient per protocol.

## 2022-08-19 NOTE — PROGRESS NOTES
IM Progress Note    Admit Date:  8/17/2022    81 y.o. male with ischemic cardiomyopathy/Chronic combined CHF, CAD, Gout, and hypertension who presents to [facility] with c/o fatigue and weakness  He has had significant weight loss, almost 70 pounds in the last 6 months. Admitted for CRUZITO, lactic acidosis, dysphagia    Code status discussed with patient. He is a DNR-CC    CT scan of chest abdomen and pelvis on admission did not show any significant abnormality  S/P EGD , abdominal ultrasound    Subjective:  Mr. Kayode Alston appears very frail and weak . [place] He pulled out his IV this morning , spoke to him again > now he  is agreeable to getting IV placed . Speech therapy following him for dysphagia . Modified barium swallow and esophagogram pending . Endoscopy showed duodenal ulcer, no significant strictures. Objective:      Vitals:    08/18/22 1951 08/19/22 0028 08/19/22 0230 08/19/22 0804   BP: 105/74 100/69 99/73 97/69   Pulse: 90 85 83 84   Resp: 16 16 15 15   Temp: 97 °F (36.1 °C) 97 °F (36.1 °C) 97.2 °F (36.2 °C) 97 °F (36.1 °C)   TempSrc: Axillary Axillary Axillary Axillary   SpO2: 99% 99%  99%   Weight:  129 lb 14.4 oz (58.9 kg)     Height:                  Intake/Output Summary (Last 24 hours) at 8/19/2022 1325  Last data filed at 8/19/2022 1255  Gross per 24 hour   Intake 60 ml   Output 250 ml   Net -190 ml         Physical Exam:    Gen: Chronically ill-appearing, frail, elderly male. He is awake alert and oriented. Looks very fatigued, cachectic  Eyes: PERRL. No sclera icterus. No conjunctival injection. ENT: No discharge. Pharynx clear. Neck: No JVD. No Carotid Bruit. Trachea midline. Resp: No accessory muscle use. No crackles. No wheezes. No rhonchi. CV: Regular rate. Regular rhythm. No murmur. No rub. trace edema. GI: Non-tender. Non-distended. Normal bowel sounds. No hernia. Skin: Warm and dry. No nodule on exposed extremities. No rash on exposed extremities. M/S: No cyanosis.  No joint deformity. No clubbing. Neuro: Awake. Grossly nonfocal    Psych: Oriented x 3. No anxiety or agitation. Scheduled Meds:   pantoprazole  40 mg Oral BID AC    sodium chloride flush  5-40 mL IntraVENous 2 times per day       Continuous Infusions:   sodium chloride      sodium chloride Stopped (08/19/22 0731)       PRN Meds:  sodium chloride flush, sodium chloride, ondansetron **OR** ondansetron, polyethylene glycol      Data:  CBC:   Recent Labs     08/17/22  1111 08/18/22 0323 08/19/22 0429   WBC 6.7 6.6 5.9   HGB 15.5 15.8 15.2   HCT 47.0 47.6 47.5   .5* 108.6* 112.5*   PLT 51* 36* 34*       BMP:   Recent Labs     08/17/22 1111 08/18/22 0323 08/19/22 0429    145 142   K 4.7 4.8 4.8    106 107   CO2 21 18* 11*   * 121* 118*   CREATININE 1.8* 1.7* 1.6*       LIVER PROFILE:   Recent Labs     08/17/22  1111 08/18/22 0323 08/19/22 0429   * 240* 190*   * 289* 258*   LIPASE 44.0  --   --    BILITOT 4.1* 4.3* 4.0*   ALKPHOS 159* 146* 148*         CULTURES  Covid and flu not detected  Blood cx x 2 pending     RADIOLOGY  US GALLBLADDER RUQ   Final Result   1. Cholelithiasis, without sonographic evidence of cholecystitis. 2. Unremarkable sonographic appearance of the liver, common bile duct, right   kidney, and pancreas. 3. Mild amount of upper abdominal ascites. 4. Right pleural effusion. CT HEAD WO CONTRAST   Final Result      1. No evidence of acute intracranial process. 2.  Findings of presumed small vessel ischemic deep white matter disease. CT SOFT TISSUE NECK WO CONTRAST   Final Result   No acute abnormality of the soft tissue structures of the neck visualized. Incidental findings as detailed above. CT CHEST ABDOMEN PELVIS WO CONTRAST   Final Result   1. No acute process in the chest, abdomen or pelvis. 2. No convincing evidence of malignancy. 3. Small right and trace left pleural effusions.    4. Mild nonspecific pulmonary fibrosis. 5. Few mildly enlarged mediastinal lymph nodes, nonspecific but favored to be   reactive. 6. Few solid pulmonary nodules measuring up to 5 mm. If the patient is low   risk for lung cancer, no follow-up is advised. If high risk, optional   follow-up chest CT in 12 months per Fleischner criteria. The   7. Cholelithiasis. 8. Punctate layering bladder calculus. XR CHEST PORTABLE   Final Result   Mild pulmonary vascular congestion. Fluoroscopy modified barium swallow with video    (Results Pending)   FL ESOPHAGRAM    (Results Pending)          ECHO 8/14/19  - Left ventricle: The cavity size is moderately dilated. Wall     thickness is normal. Systolic function was moderately reduced. The estimated ejection fraction was in the range of 30% to 35%. Moderate diffuse hypokinesis. Doppler parameters are consistent     with a restrictive pattern, indicative of decreased left     ventricular diastolic compliance and increased left atrial     pressure (grade 3 diastolic dysfunction). The global longitudinal     strain was -10%. - Aortic valve: There was mild regurgitation.   - Mitral valve: There was moderate regurgitation, directed     eccentrically and toward the free wall. - Left atrium: The atrium is mildly dilated. - Right atrium: The atrium is mildly dilated. - Inferior vena cava: The vessel was normal in size; the     respirophasic diameter changes were in the normal range (&gt;= 50%);     findings are consistent with normal central venous pressure. Assessment/Plan:    #CRUZITO  -previous creatinine values 0.7-1; previous BUN values of 31  -creatinine on admission 1.8, with significantly elevated   -likely due to poor PO intake, dysphagia  -Hydrated with IVF's. Monitor BMP. -> 118 with creatinine of 1.8->1.7 -> 1.6 today.     Continue gentle hydration  Rule out GI bleed source     #Lactic acidosis  -IVF's, trend Lactic    #Dysphagia  -trouble swallowing, eating  -chokes on water  -SLP eval -> modified barium swallow and esophagogram today  -GI Consulted     S/P EGD on 8/18  Showed portal gastropathy and duodenal ulcer. No significant esophageal stricture or stenosis. Cont PPI. Biopsy sent. #Elevated LFT's  #Hyperbilirubinemia  -Trend LFT's  -repeat stable  -GI consulted  - check RUQ USG-shows cholelithiasis mild ascites    #Significant weight loss  -Patient cachectic  -CT scan of chest abdomen and pelvis done on admission did not show any acute pathology. No masses noted on EGD . Patient needs colonoscopy. Can be done as an outpatient if patient agreeable    #Ischemic cardiomyopathy  #Chronic combined CHF  #Possible cardiac cachexia contributing to patient's poor p.o. intake, weight loss  -last echo above -EF 30 to 35% in 2019  -ProBNP is quite elevated , over 72353  -repeat ECHO pending  -currently receiving IVF's. Decrease rate to 50 an hour , monitor closely for decompensation    #CAD  #Elevated troponin  -0.04--0.03--0.03  -? Chronic 0.06 in 2020 per care everywhere  -s/p coronary angioplasty  -not on any medications  -denies CP   -EKG with possible lateral ischemia; incomplete LBBB has been seen on previous    #Thrombocytopenia  -51--36  -DVT prophylaxis held  -Patient refuses blood products    #Macrocytosis  -monitor CBC     #Mediastinal lymph nodes  -on CT. Favored to be reactive     #Pulmonary nodules  -no follow up advised  -non-smoker     #Hypertension  -BP stable. #Gout  -held home regimen currently    #Severe [facility]  -dietician consult, encourage nutritional supplements    #Profound weakness and debility  -Consult PT OT    DVT Prophylaxis: Held with TCP  Diet: ADULT DIET;  Dysphagia - Soft and Bite Sized  ADULT ORAL NUTRITION SUPPLEMENT; Breakfast, Lunch, Dinner; Frozen Oral Supplement  ADULT ORAL NUTRITION SUPPLEMENT; Breakfast, Lunch, PM Snack; Standard High Calorie/High Protein Oral Supplement  Code Status: DNR-CC      Await speech therapy eval.   Place IV and start gentle IV fluids  Patient with failure to thrive. Severely cachectic. Has significant dysphagia. Poor oral intake. Ongoing palliative care discussions with the family. Addendum  Patient hypothermic now, Chris hugger placed.       Corey Soto MD

## 2022-08-19 NOTE — PROGRESS NOTES
Speech Language Pathology  RN called and stated that patient is adamantly refusing MBS. Education was provided regarding importance of procedure. Pt continues to decline. Radiology department does not do MBS over the weekend. No MBS charges filed.      Marielle Bedolla M.A., [address] [facility] #[id number]  Speech-Language Pathologist  Phone: [place]- [phone], Desk- [phone]  Hours: M-F [phone]

## 2022-08-19 NOTE — CARE COORDINATION
INTERDISCIPLINARY PLAN OF CARE CONFERENCE    Date/Time: 8/19/2022 3:20 PM  Completed by: JAMSHID Javier  Case Management      Patient Name:  Tresa Boas, Dr.  YOB: 1940  Admitting Diagnosis: CRUZITO (acute kidney injury) [facility]) [N17.9]     Admit Date/Time:  8/17/2022 10:37 AM    Chart reviewed. Interdisciplinary team contacted or reviewed plan related to patient progress and discharge plans. Disciplines included Case Management, Nursing, and Dietitian. Current Status:ongoing   PT/OT recommendation for discharge plan of care:  Per PT note Discharge Recommendations: SNF ; will need reliable 24/7 assist if he chooses to go home. DME needs for discharge: defer to facility. If going home, will benefit from [facility]. Expected D/C Disposition:  Home vs TBD  Confirmed plan with patient and/or family Yes confirmed with: (name) pt and daughter Ara     Discharge Plan Comments: Chart review completed. Met with pt at bedside. Discussed SNF recommendations with pt and he stated he doesn't want to go to SNF but would be in agreement with skilled [facility] as he has had it in the past. Inquired who would help him at home for 24/7 assist and he stated he thinks his wife. Pt stated writer could call his daughter Emily Lyons to discuss and stated that they would need to discuss SNF. SNF and [facility] list left at bedside. No further needs expressed by pt. Called and spoke with Ara via phone call. Discussed the above with her. She stated that she will be here tomorrow morning to discuss plan of care with pt and then bring pt's wife to see pt too. She stated if pt goes home, he would need to go with hospice for support and has the hospice list from Palliative Care RN yesterday. She stated that she will need to arrange 24/7 care if pt doesn't go to SNF which she knows pt doesn't want to go too. Ara stated she will call weekend CM with decision of SNF vs hospice; number given.  Provided supportive listening

## 2022-08-19 NOTE — PROGRESS NOTES
Perfect serve sent to Dr. Jelani Shrestha regarding pt. removing IV and refusing to have one replaced. AM labs resulted Co2 11 and was 18 yesterday. Plts are 34. Update:   Per Dr. Jelani Shrestha - pt is ok to not have IV access.

## 2022-08-19 NOTE — PROGRESS NOTES
Writer administered protonix PO and patient swallowed then pt states he was stuck in espophagus. Pt began vomiting. Once pt vomited 2x and pt cleared throat. No aspiration noted at this time.

## 2022-08-19 NOTE — PROGRESS NOTES
PROGRESS NOTE  S:82 yrs Patient  admitted on 8/17/2022 with [facility]ITO (acute kidney [facility]) [facility] [N17.9] . Today he remains confused. complains of weakness. Refused MBS     Exam:   Vitals:    08/19/22 1515   BP:    Pulse:    Resp:    Temp: (!) 96 °F (35.6 °C)   SpO2:       General appearance: cachectic and fatigued  HEENT: Sclera clear, anicteric  Neck: no adenopathy and supple, symmetrical, trachea midline  Lungs: clear to auscultation bilaterally  Heart: regular rate and rhythm  Abdomen: soft, non-tender; bowel sounds normal; no masses,  no organomegaly  Extremities: extremities normal, atraumatic, no cyanosis or edema     Medications: Reviewed    Labs:  CBC:   Recent Labs     08/17/22  1111 08/18/22  0323 08/19/22  0429   WBC 6.7 6.6 5.9   HGB 15.5 15.8 15.2   HCT 47.0 47.6 47.5   .5* 108.6* 112.5*   PLT 51* 36* 34*     BMP:   Recent Labs     08/17/22  1111 08/18/22  0323 08/19/22  0429    145 142   K 4.7 4.8 4.8    106 107   CO2 21 18* 11*   * 121* 118*   CREATININE 1.8* 1.7* 1.6*     LIVER PROFILE:   Recent Labs     08/17/22  1111 08/18/22  0323 08/19/22  0429   * 240* 190*   * 289* 258*   LIPASE 44.0  --   --    PROT 7.3 6.6 5.8*   BILITOT 4.1* 4.3* 4.0*   ALKPHOS 159* 146* 148*       RUQ US:  1. Cholelithiasis, without sonographic evidence of cholecystitis. 2. Unremarkable sonographic appearance of the liver, common bile duct, right   kidney, and pancreas. 3. Mild amount of upper abdominal ascites. 4. Right pleural effusion. Impression:81year old male with history of HTN, CAD s/p stent, COPD, CHF, gout admitted with failure to thrive. EGD was negative. US shows normal liver and mild ascites.  Elevated LFTs likely due to passive congestion    Recommendation:  Continue supportive care  Speech therapy to follow  Upright position during meals and 4h after  Encourage nutrition with supplements  PT/OT  Monitor LFTs  Liver serology workup in progress  Consider cardiology consult for decompensated CHF       Silvino Cao MD, MD  5:04 PM 8/19/2022

## 2022-08-19 NOTE — PROGRESS NOTES
Speech Language Pathology    MBS tentatively scheduled for 1300. Formal report to follow procedure. OK to continue with current diet pending MBS.     Marielle Bedolla M.A., [facility] #[id number]  Speech-Language Pathologist  Phone: [place]- [phone], Desk- [phone]  Hours: M-F [phone]

## 2022-08-19 NOTE — PROGRESS NOTES
Inpatient Occupational Therapy  Evaluation and Treatment    Unit: PCU  Date:  8/19/2022  Patient Name:    Dr. Kuldip Caceres diagnosis:  CRUZITO (acute kidney injury) [facility]) [N17.9]  Admit Date:  8/17/2022  Precautions/Restrictions/WB Status/ Lines/ Wounds/ Oxygen: fall risk, bed/chair alarm, and telemetry    Treatment Time:  855-1015  Treatment Number: 1     Billable Treatment Time: 70 minutes   Total Treatment Time:   80   minutes    Patient Goals for Therapy:  \" get back to where I was a few days ago, and talk better \"      Discharge Recommendations: SNF  DME needs for discharge: defer to facility    If going home will need 24 hour assist and HHOT/PT front loaded       Therapy recommendations for staff:   Assist of 1 with use of rolling walker (RW) and gait belt for all transfers to/from BSC/chair-watch BP    History of Present Illness: shaina Sorto NP H&P 8/17/22:  \"The patient is a 81 y.o. male with ischemic cardiomyopathy/Chronic combined CHF, CAD, Gout, and hypertension who presents to [facility] with c/o fatigue and weakness. Patient has had difficulty swallowing and eating for a while. He has had a significant decline in the last 6 weeks. He states yesterday he slid out of his chair and was unable to get up off the floor. He came to the ED today for evaluation. He has not wanted an Endoscopy, but has decided he would be okay if this is needed. He has a grandson getting  on Friday and wants to be there. He has lost a significant amount of weight in the last 6 months. He was 203 lbs on Oct 2021. Now weight is 127 lbs. He is taking in some liquids. Sometimes he gets choked on the liquids. If he eats solids, he feels like it sits there and it doesn't go down. He has shortness of breath at times. Labs with CRUZITO, lactic acidosis, BNP > 70,000, troponin 0.04, elevated LFT's, hyperbilirubinemia, macrocytosis, and thrombocytopenia. Admitted to PCU on tele.   GI consulted. \"    Home Health S4 Level Recommendation:  Level 3 Safety if going home with 24 hour assist despite SNF recommendation  AM-PAC Score: AM-PAC Inpatient Daily Activity Raw Score: 15    Preadmission Environment    Pt. Lives with spouse and 24/7 Assist Available  per patient; per chart, patient is caregiver for his wife who has dementia. Patient states, \"We both help each other but she's had to help me more lately. \"  Home environment:  condo  Steps to enter first floor:    1 steps to enter    Steps to second floor: N/A  Bathroom:  Tub/Shower unit, Walk-in Shower, Grab bars, Shower Chair , and comfort height toilet  Equipment owned:  Rollator , small base quad cane (SBQC), manual W/C, and lift chair     Preadmission Status / PLOF:  History of falls   Yes slid out of lift chair prior to this admission, reports one other fall in last 6 mos-lost balance getting out of a chair  Pt. Able to drive   Yes \"only when necessary\"-adult children often drive for patient  Pt Fully independent with ADL's  No-wife has been assisting recently to speed process along, patient moves slowly  Pt. Required assistance from family for:  Bathing, Cleaning, Cooking, Dressing, and Laundry     Pt. Fully independent for transfers and gait and walked with: Rollator, sometimes uses manual w/c in community  Reports consciously trying to stop eating about a year ago to lose weight in an effort to be able to be assisted by wife during aging (\"I weighed 1 and my wife weighed 119. \")  Lately reports realizing weight was \"a little too much\" and then realized it was hard to eat much. Reports he can still feel some of his breakfast (a few bites eaten with SLP) in the back of his throat and cleared throat frequently during evaluation.   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 Upper Extremity ROM:    WFL,  pt able to perform all bed mobility, transfers, and gait without ROM limitation. Upper Extremity Strength:    BUE strength impaired but not formally assessed w/ MMT    Upper Extremity Sensation    WFL    Upper Extremity Proprioception:  WFL    Coordination and Tone  WFL    Balance  Functional Sitting Balance:  WFL static, SBA dynamic  Functional Standing Balance:Impaired CGA with RW    Bed mobility:    Supine to sit:   Min A  Sit to supine:   Not Tested  Rolling:    Not Tested  Scooting in sitting:  CGA  Scooting to head of bed:   Not Tested    Bridging:   Not Tested    Transfers:    Sit to stand:  Min A initially, improved to CGA on 2nd attempt, VC for hand placement  Stand to sit:  Min A and VC for hand placement  Bed to chair:   Min A, with use of RW, and VC for hand placement, cues for walker safety  Standard toilet: Not Tested  Bed to [facility]:  Not Tested    Dressing:      UE: Mod A don gown  LE:    Mod A don pullup, socks, pants    Bathing:    UE:  Not Tested  LE:  Not Tested    Eating: Mod A ate one bite of \"magic cup\" and reported it tasted horrible, able to drink from straw with SBA after OT handed it to him; easily fatigued    Toileting: Max A wipe bottom, bed pad saturated and patient did not reach back to wipe skin off. RN notified of red areas on bottom. MOD A to pull up incontinence product (pullup). Grooming: Not Tested    Activity Tolerance   Pt completed therapy session with Dizziness noted with upright posture  EOB:  SpO2: unable to read, very cold hands  HR: 84  BP: 85/64    After stand pivot transfer to chair:  BP: error readings initially, 92/66 when able to get a reading  HR: 85      Positioning Needs:   Reclined in chair with call light and needs in reach.    Alarm Set    Exercise / Activities Initiated:   N/A    Patient/Family Education:   Role of OT  Recommendations for DC  Energy conservation techniques  Safe RW use/hand placement    Assessment of Deficits: Pt seen for Occupational therapy evaluation in acute care setting. Pt demonstrated decreased Activity tolerance, ADLs, IADLs, Bed mobility, Safety Awareness, Strength, and Transfers. Pt functioning below baseline and will likely benefit from skilled occupational therapy services to maximize safety and independence. Goal(s) : To be met in 3 Visits:  1). Bed to toilet/BSC: SBA    To be met in 5 Visits:  1). Supine to/from Sit:  Modified Independent  2). Upper Body Bathing:   SBA  3). Lower Body Bathing:   CGA  4). Upper Body Dressing:  SBA  5). Lower Body Dressing:  CGA  6). Pt to demonstrate UE exs x 15 reps with minimal cues    Rehabilitation Potential:  Good for goals listed above. Strengths for achieving goals include: PLOF, Family Support, and Pt cooperative  Barriers to achieving goals include:  Complexity of condition and Weakness     Plan: To be seen 3-5 x/wk while in acute care setting for therapeutic exercises, bed mobility, transfers, dressing, bathing, family/patient education, ADL/IADL retraining, energy conservation training.      Kate Valdez, OTR/L [id number]            If patient discharges from this facility prior to next visit, this note will serve as the Discharge Summary

## 2022-08-19 NOTE — FLOWSHEET NOTE
08/19/22 1515   Vital Signs   Temp (!) 96 °F (35.6 °C)   Temp Source Rectal     Patient removing warming blanket states, \"It's to hot\". Writer encouraged patient to keep warming blanket on him.

## 2022-08-19 NOTE — PROGRESS NOTES
Writer called patient daughter Juan Diego Andre (ae Radar is unavailable at this time for a wedding) and provided update with Echo results and updated body temperature. Juan Diego Andre denies any further questions at this time and expressed for staff to call with any significant changes no matter the time.

## 2022-08-19 NOTE — PLAN OF CARE
Problem: SLP Adult - Impaired Swallowing  Goal: By Discharge: Advance to least restrictive diet without signs or symptoms of aspiration for planned discharge setting. See evaluation for individualized goals. Note: SLP completed evaluation. Please refer to notes in EMR.     Ashanti Powell M.A., [facility] #[id number]  Speech-Language Pathologist  Phone: [facility]- [phone], Desk- [phone]  Hours: M-F [phone]

## 2022-08-19 NOTE — FLOWSHEET NOTE
08/19/22 1359   Vital Signs   Temp (!) 95 °F (35 °C)   Temp Source Rectal   Heart Rate 87   Heart Rate Source Monitor   Resp 14   BP 94/67   BP Location Right upper arm   BP Method Automatic   MAP (Calculated) 76   Patient Position Semi fowlers   Level of Consciousness Alert (0)   MEWS Score 1   Oxygen Therapy   SpO2 95 %   O2 Device None (Room air)     MD notified of rectal temperature see Orders. External male palm placed. New sacrum foam dsg. placed.

## 2022-08-20 ENCOUNTER — APPOINTMENT (OUTPATIENT)
Dept: GENERAL RADIOLOGY | Age: 82
DRG: 682 | End: 2022-08-20
Payer: COMMERCIAL

## 2022-08-20 VITALS
TEMPERATURE: 96 F | DIASTOLIC BLOOD PRESSURE: 64 MMHG | OXYGEN SATURATION: 94 % | SYSTOLIC BLOOD PRESSURE: 95 MMHG | RESPIRATION RATE: 10 BRPM | HEART RATE: 88 BPM | WEIGHT: 132.2 LBS | HEIGHT: 71 IN | BODY MASS INDEX: 18.51 KG/M2

## 2022-08-20 LAB
A/G RATIO: 1.4 (ref 1.1–2.2)
ALBUMIN SERPL-MCNC: 3.2 G/DL (ref 3.4–5)
ALP BLD-CCNC: 148 U/L (ref 40–129)
ALT SERPL-CCNC: 215 U/L (ref 10–40)
ANION GAP SERPL CALCULATED.3IONS-SCNC: 18 MMOL/L (ref 3–16)
ANISOCYTOSIS: ABNORMAL
AST SERPL-CCNC: 159 U/L (ref 15–37)
BANDED NEUTROPHILS RELATIVE PERCENT: 1 % (ref 0–7)
BASOPHILS ABSOLUTE: 0 K/UL (ref 0–0.2)
BASOPHILS RELATIVE PERCENT: 0 %
BILIRUB SERPL-MCNC: 3.4 MG/DL (ref 0–1)
BUN BLDV-MCNC: 125 MG/DL (ref 7–20)
CALCIUM SERPL-MCNC: 8.9 MG/DL (ref 8.3–10.6)
CHLORIDE BLD-SCNC: 108 MMOL/L (ref 99–110)
CO2: 16 MMOL/L (ref 21–32)
CREAT SERPL-MCNC: 1.8 MG/DL (ref 0.8–1.3)
EOSINOPHILS ABSOLUTE: 0 K/UL (ref 0–0.6)
EOSINOPHILS RELATIVE PERCENT: 0 %
GFR AFRICAN AMERICAN: 44
GFR NON-AFRICAN AMERICAN: 36
GLUCOSE BLD-MCNC: 126 MG/DL (ref 70–99)
HCT VFR BLD CALC: 45.3 % (ref 40.5–52.5)
HEMATOLOGY PATH CONSULT: NO
HEMOGLOBIN: 14.6 G/DL (ref 13.5–17.5)
LYMPHOCYTES ABSOLUTE: 0.6 K/UL (ref 1–5.1)
LYMPHOCYTES RELATIVE PERCENT: 9 %
MCH RBC QN AUTO: 34.9 PG (ref 26–34)
MCHC RBC AUTO-ENTMCNC: 32.3 G/DL (ref 31–36)
MCV RBC AUTO: 108.1 FL (ref 80–100)
MICROCYTES: ABNORMAL
MONOCYTES ABSOLUTE: 0.6 K/UL (ref 0–1.3)
MONOCYTES RELATIVE PERCENT: 10 %
NEUTROPHILS ABSOLUTE: 5.1 K/UL (ref 1.7–7.7)
NEUTROPHILS RELATIVE PERCENT: 80 %
NUCLEATED RED BLOOD CELLS: 7 /100 WBC
PDW BLD-RTO: 18.7 % (ref 12.4–15.4)
PLATELET # BLD: 40 K/UL (ref 135–450)
PLATELET SLIDE REVIEW: ABNORMAL
PMV BLD AUTO: 10.7 FL (ref 5–10.5)
POLYCHROMASIA: ABNORMAL
POTASSIUM REFLEX MAGNESIUM: 4.6 MMOL/L (ref 3.5–5.1)
RBC # BLD: 4.19 M/UL (ref 4.2–5.9)
SLIDE REVIEW: ABNORMAL
SODIUM BLD-SCNC: 142 MMOL/L (ref 136–145)
TOTAL PROTEIN: 5.5 G/DL (ref 6.4–8.2)
WBC # BLD: 6.3 K/UL (ref 4–11)

## 2022-08-20 PROCEDURE — 85025 COMPLETE CBC W/AUTO DIFF WBC: CPT

## 2022-08-20 PROCEDURE — 80053 COMPREHEN METABOLIC PANEL: CPT

## 2022-08-20 PROCEDURE — 99239 HOSP IP/OBS DSCHRG MGMT >30: CPT | Performed by: INTERNAL MEDICINE

## 2022-08-20 PROCEDURE — 36415 COLL VENOUS BLD VENIPUNCTURE: CPT

## 2022-08-20 NOTE — ACP (ADVANCE CARE PLANNING)
Advance Care Planning     Advance Care Planning Inpatient Note  [facility] Department    Today's Date: 8/20/2022  Unit: [address] PCU TELEMETRY    Received request from patient and family. Upon review of chart and communication with care team, patient's decision making abilities are not in question. . Patient and Child/Children was/were present in the room during visit. Goals of ACP Conversation:  Discuss advance care planning documents    Health Care Decision Makers:       Primary Decision Maker: Fawn Gonsalez Child - [phone]    Secondary Decision Maker: Tiny Villagran  Child - [phone]  Summary:  Completed New Documents  Updated Healthcare Decision Maker    Advance Care Planning Documents (Patient Wishes):  Healthcare Power of /Advance Directive Appointment of Health Care Agent  Living Will/Advance Directive     Assessment:  Pt clear that he wanted \"no heroics\" done to keep him alive. Interventions:  Assisted in the completion of documents according to patient's wishes at this time    Care Preferences Communicated:   No    Outcomes/Plan:  New advance directive completed. Returned original document(s) to patient, as well as copies for distribution to appointed agents  Copy of advance directive given to staff to scan into medical record.     Electronically signed by Alyssa Tanner on 8/20/2022 at 5:04 PM

## 2022-08-20 NOTE — CARE COORDINATION
INTERDISCIPLINARY PLAN OF CARE CONFERENCE    Date/Time: 8/20/2022 11:08 AM  Completed by: Tabby De Oliveira RN, Case Management      Patient Name:  Raven Vann Dr.  YOB: 1940  Admitting Diagnosis: CRUZITO (acute kidney injury) [facility]) [N17.9]     Admit Date/Time:  8/17/2022 10:37 AM    Chart reviewed. Interdisciplinary team contacted or reviewed plan related to patient progress and discharge plans. Disciplines included Case Management, Nursing, and Dietitian. Current Status: IP 08/17/2022  PT/OT recommendation for discharge plan of care: ordered    Expected D/C Disposition:  TBD  Discharge Plan Comments:   Family requests hospice consult for potential [facility] admission.  Consult called and face sheet faxed to [address] [phone] at family's request.     Home O2 in place on admit: No  Pt informed of need to bring portable home O2 tank on day of discharge for nursing to connect prior to leaving:  No  Verbalized agreement/Understanding:  No    ADDENDUM 12:20   Hospice [facility] will meet with patient at 4 PM today

## 2022-08-20 NOTE — PROGRESS NOTES
Patient pulled his IV out. IV catheter intact & site WNL. Patient refuses new IV stick. Continue to monitor closely.   Candida Gomez RN

## 2022-08-20 NOTE — PROGRESS NOTES
Name: Dr. Shilpa Jiang: 1940  Medical Diagnosis: CRUZITO (acute kidney injury) [facility]) [N17.9]        Speech Language Pathology  Attempt Note    Attempted to see pt for dysphagia tx. Spoke with Anais/RN who reports pt/family plan to pursue hospice, with meeting this afternoon. Pt is sipping water for comfort. RN denies concerns that ST needs to address as pt goals are shifting to comfort. RN advised to contact ST if new questions arise. D/c pt from [address] [place] [address]  No further ST recommended at this time. Baby Myah. Katherine Ferrari M.A.  [address] [facility], [place], operated by [facility]  Speech-Language Pathologist

## 2022-08-20 NOTE — PROGRESS NOTES
Perfect serve sent to Dr. Kate Lyons regarding transfer [facility] with [address] DNR form needs signed, discharge order needs placed.

## 2022-08-20 NOTE — PROGRESS NOTES
Resting quietly with respirations easy/even on RA. Call in easy reach. Bed alarm on for safety. Continue to monitor closely.   Ellery Sever, RN

## 2022-08-20 NOTE — PROGRESS NOTES
Perfect serve sent to Dr. Najma Singleton regarding intermittent cardiac rhythm changes as follows:   3 beats v-tach follwed by 8 beats PVC, short run SVT, 8 beats v-tach at 0705, 5 beats v-tach 0733. Pt current rhythm NSR.

## 2022-08-20 NOTE — PROGRESS NOTES
Axillary temp 96F. Patient does not want the warming blanket on. Per MELANIE Bullock she wants him to be comfortable.

## 2022-08-20 NOTE — PROGRESS NOTES
Telemetry monitor removed and returned to [facility]. Pt left facility in stable condition with [employer] to [employer] in [facility] with all of their personal belongings.

## 2022-08-20 NOTE — FLOWSHEET NOTE
08/20/22 0735   Vital Signs   Temp (!) 93 °F (33.9 °C)   Temp Source Axillary   Heart Rate 85   Heart Rate Source Monitor   Resp 10   BP 94/63   MAP (Calculated) 73.33   Level of Consciousness New confusion or agitation (1)   MEWS Score 3   Oxygen Therapy   SpO2 97 %   Pulse Oximetry Type Intermittent   O2 Device None (Room air)     Shift assessment - complete, status changed writer updated MD and family - see prior notes. Patient states he feels \"foggy\" new onset confusion, warm blanket applied. Call light within reach.

## 2022-08-20 NOTE — PROGRESS NOTES
Writer spoke at the bedside with Ara. Ara has decided to consult HOC for IPU. Writer notified MD/CM. Ara states she will come back in to to sign necessary paperwork or come back in if status changes.

## 2022-08-20 NOTE — PROGRESS NOTES
PROGRESS NOTE  S:82 yrs Patient  admitted on 8/17/2022 with [facility] [N17.9] . Today he remains confused. complains of weakness. Refused MBS     Exam:   Vitals:    08/20/22 0035   BP: (!) 90/58   Pulse: 84   Resp: 14   Temp: 96.8 °F (36 °C)   SpO2: 95%      General appearance: cachectic and fatigued  HEENT: Sclera clear, anicteric  Neck: no adenopathy and supple, symmetrical, trachea midline  Lungs: clear to auscultation bilaterally  Heart: regular rate and rhythm  Abdomen: soft, non-tender; bowel sounds normal; no masses,  no organomegaly  Extremities: extremities normal, atraumatic, no cyanosis or edema     Medications: Reviewed    Labs:  CBC:   Recent Labs     08/17/22  1111 08/18/22  0323 08/19/22  0429   WBC 6.7 6.6 5.9   HGB 15.5 15.8 15.2   HCT 47.0 47.6 47.5   .5* 108.6* 112.5*   PLT 51* 36* 34*       BMP:   Recent Labs     08/18/22  0323 08/19/22  0429 08/20/22  0415    142 142   K 4.8 4.8 4.6    107 108   CO2 18* 11* 16*   * 118* 125*   CREATININE 1.7* 1.6* 1.8*       LIVER PROFILE:   Recent Labs     08/17/22  1111 08/18/22  0323 08/19/22  0429 08/20/22  0415   * 240* 190* 159*   * 289* 258* 215*   LIPASE 44.0  --   --   --    PROT 7.3 6.6 5.8* 5.5*   BILITOT 4.1* 4.3* 4.0* 3.4*   ALKPHOS 159* 146* 148* 148*         RUQ US:  1. Cholelithiasis, without sonographic evidence of cholecystitis. 2. Unremarkable sonographic appearance of the liver, common bile duct, right   kidney, and pancreas. 3. Mild amount of upper abdominal ascites. 4. Right pleural effusion. Impression:81year old male with history of HTN, CAD s/p stent, COPD, CHF, gout admitted with failure to thrive. EGD was negative. US shows normal liver and mild ascites.  Elevated LFTs likely due to passive congestion    Recommendation:  Continue supportive care  Speech therapy to follow  Upright position during meals and 4h after  Encourage nutrition with supplements  PT/OT  Monitor LFTs  Liver serology workup in progress  Consider cardiology consult for decompensated CHF       Asia Mejia MD, MD  7:11 AM 8/20/2022

## 2022-08-20 NOTE — PROGRESS NOTES
Perfect serve sent to Dr. Marylee Bow:   Pt status change:   94/63, O2 97% RA, R 10 shallow, T 93 axillary, HR 85, radial pulses +1. PT states he feels \"foggy\" . pt is slightly confused. code status DNR-CC. Mews 3  Warming blanket applied. Update:   Writer spoke with Dr. Marylee Bow. Per MD request to call the family in. Writer called Ara pt POA and provided update. Ara states she will call the family and they will come in this morning.

## 2022-08-20 NOTE — ACP (ADVANCE CARE PLANNING)
Spoke to Pt's daughter about Pt's HCPOA form. Daughter stated she has not been able to find previously completed form. Offered to check back with Pt to see if he'd like to do new forms. Also informed daughter about state hierarchy of spouse then majority opinion of adult children. Daughter stated Pt's wife does not have capacity to make decisions on his behalf. Per Pt's RN, Pt does not have capacity at this time to make decisions and thus cannot complete new POA form.     Sena Alexis   Associate

## 2022-08-20 NOTE — PROGRESS NOTES
Orientation questions assessed prior to  services obtaining healthcare proxy. Patient surpassed orientation questions.

## 2022-08-22 LAB
ALPHA-1 ANTITRYPSIN PHENOTYPE: NORMAL
ALPHA-1 ANTITRYPSIN: 140 MG/DL (ref 90–200)
BLOOD CULTURE, ROUTINE: NORMAL
CULTURE, BLOOD 2: NORMAL
F-ACTIN AB IGG: 32 UNITS (ref 0–19)
SMOOTH MUSCLE AB IGG TITER: ABNORMAL

## 2022-08-22 NOTE — DISCHARGE SUMMARY
Name:  Rosa Duarte  Room:  /5448-71  MRN:    [id number]    Discharge Summary      This discharge summary is in conjunction with a complete physical exam done on the day of discharge. Discharging Physician: Dr. Era Carter: 8/17/2022  Discharge:  8/20/2022    HPI taken from admission H&P:    The patient is a 81 y.o. male with ischemic cardiomyopathy/Chronic combined CHF, CAD, Gout, and hypertension who presents to [facility] with c/o fatigue and weakness. Patient has had difficulty swallowing and eating for a while. He has had a significant decline in the last 6 weeks. He states yesterday he slid out of his chair and was unable to get up off the floor. He came to the ED today for evaluation. He has not wanted an Endoscopy, but has decided he would be okay if this is needed. He has a grandson getting  on Friday and wants to be there. He has lost a significant amount of weight in the last 6 months. He was 203 lbs on Oct 2021. Now weight is 127 lbs. He is taking in some liquids. Sometimes he gets choked on the liquids. If he eats solids, he feels like it sits there and it doesn't go down. He has shortness of breath at times. Labs with CRUZITO, lactic acidosis, BNP > 70,000, troponin 0.04, elevated LFT's, hyperbilirubinemia, macrocytosis, and thrombocytopenia. Admitted to PCU on tele. GI consulted. Diagnoses this Admission and Hospital Course   #CRUZITO  -previous creatinine values 0.7-1; previous BUN values of 31  -creatinine on admission 1.8, with significantly elevated   -likely due to poor PO intake, dysphagia  -Hydrated with IVF's. Monitor BMP. -> 118-> back up  to125 with creatinine of 1.8->1.7 -> 1.6   -> back up to 1.8 today. Pt refused IV after day 1 .  Pulled IV out      #Lactic acidosis  -IVF's, trended Lactic     #Dysphagia  -trouble swallowing, eating  -chokes on water  -GI Consulted     S/P EGD on 8/18  Showed portal gastropathy and duodenal ulcer. No significant esophageal stricture or stenosis. Cont PPI. Biopsy sent.  -> -SLP eval -> modified barium swallow and esophagogram recommended. Pt refused      #Elevated LFT's  #Hyperbilirubinemia  -Trend LFT's  -GI consulted  - checked RUQ USG-shows cholelithiasis mild ascites     #Significant weight loss  -Patient cachectic  -CT scan of chest abdomen and pelvis done on admission did not show any acute pathology. No masses noted on EGD . Patient needs colonoscopy. Can be done as an outpatient if patient agreeable     #Ischemic cardiomyopathy  #Chronic combined CHF  #Possible cardiac cachexia contributing to patient's poor p.o. intake, weight loss  -last echo above -EF 30 to 35% in 2019  -ProBNP is quite elevated , over [id number]  -repeat ECHO -> EF  reduced to 20 to 25 %. With global hypokinesis      #CAD  #Elevated troponin  -0.04--0.03--0.03  -? Chronic 0.06 in 2020 per care everywhere  -s/p coronary angioplasty  -not on any medications  -denies CP   -EKG with possible lateral ischemia; incomplete LBBB has been seen on previous     #Thrombocytopenia  -51--36  -DVT prophylaxis held  -Patient refuses blood products     #Macrocytosis  -monitor CBC     #Mediastinal lymph nodes  -on CT. Favored to be reactive     #Pulmonary nodules  -no follow up advised  -non-smoker     #Hypertension  -BP stable. #Gout  -held home regimen currently    #Severe [facility]  -dietician consult, encourage nutritional supplements     #Profound weakness and debility  -Consulted PT OT      Patient with failure to thrive. Severely cachectic. Has significant dysphagia. Poor oral intake. IV fluids given initially, but later pt refused   Patient hypothermic now, Chris hugger placed. He is awake alert and oriented and clearly states that he just wants to be left alone. Echocardiogram shows worsening cardiomyopathy. Patient likely has cardiac cachexia.   End stage cardiovascular disease, failure to thrive Palliative care discussions continued ; Family has met with patient and now agreeable for hospice consult. .      5 pm 8/20  Addendum. Family has met with hospice. I spoke to patient and patient's family. Patient will be discharged to hospice inpatient care unit tonight    Procedures (Please Review Full Report for Details)  EGD biopsy     Consults    GI       Physical Exam at Discharge:    BP 95/64   Pulse 88   Temp (!) 96 °F (35.6 °C) (Axillary)   Resp 10   Ht 5' 11\" (1.803 m)   Wt 132 lb 3.2 oz (60 kg)   SpO2 94%   BMI 18.44 kg/m²   Gen: Chronically ill-appearing, frail, elderly male. He is awake alert and oriented. Looks very fatigued, cachectic  Eyes: PERRL. No sclera icterus. No conjunctival injection. ENT: No discharge. Pharynx clear. Neck: No JVD. No Carotid Bruit. Trachea midline. Resp: No accessory muscle use. No crackles. No wheezes. No rhonchi. CV: Regular rate. Regular rhythm. No murmur. No rub. trace edema. GI: Non-tender. Non-distended. Normal bowel sounds. No hernia. Skin: Warm and dry. No nodule on exposed extremities. No rash on exposed extremities. M/S: No cyanosis. No joint deformity. No clubbing. Neuro: Awake. Grossly nonfocal    Psych: Oriented x 3. No anxiety or agitation.     Lab Results   Component Value Date    WBC 6.3 08/20/2022    HGB 14.6 08/20/2022    HCT 45.3 08/20/2022    .1 (H) 08/20/2022    PLT 40 (L) 08/20/2022     Lab Results   Component Value Date     08/20/2022    K 4.6 08/20/2022     08/20/2022    CO2 16 (L) 08/20/2022     (HH) 08/20/2022    CREATININE 1.8 (H) 08/20/2022    GLUCOSE 126 (H) 08/20/2022    CALCIUM 8.9 08/20/2022    PROT 5.5 (L) 08/20/2022    LABALBU 3.2 (L) 08/20/2022    BILITOT 3.4 (H) 08/20/2022    ALKPHOS 148 (H) 08/20/2022     (H) 08/20/2022     (H) 08/20/2022    LABGLOM 36 (A) 08/20/2022    GFRAA 44 (A) 08/20/2022    AGRATIO 1.4 08/20/2022    GLOB 3.9 10/28/2021       CULTURES  Covid and flu not detected  Blood cx x 2 NGTD     RADIOLOGY  US GALLBLADDER RUQ   Final Result   1. Cholelithiasis, without sonographic evidence of cholecystitis. 2. Unremarkable sonographic appearance of the liver, common bile duct, right   kidney, and pancreas. 3. Mild amount of upper abdominal ascites. 4. Right pleural effusion. CT HEAD WO CONTRAST   Final Result      1. No evidence of acute intracranial process. 2.  Findings of presumed small vessel ischemic deep white matter disease. CT SOFT TISSUE NECK WO CONTRAST   Final Result   No acute abnormality of the soft tissue structures of the neck visualized. Incidental findings as detailed above. CT CHEST ABDOMEN PELVIS WO CONTRAST   Final Result   1. No acute process in the chest, abdomen or pelvis. 2. No convincing evidence of malignancy. 3. Small right and trace left pleural effusions. 4. Mild nonspecific pulmonary fibrosis. 5. Few mildly enlarged mediastinal lymph nodes, nonspecific but favored to be   reactive. 6. Few solid pulmonary nodules measuring up to 5 mm. If the patient is low   risk for lung cancer, no follow-up is advised. If high risk, optional   follow-up chest CT in 12 months per Fleischner criteria. The   7. Cholelithiasis. 8. Punctate layering bladder calculus. XR CHEST PORTABLE   Final Result   Mild pulmonary vascular congestion. Fluoroscopy modified barium swallow with video    (Results Pending)       ECHO 8/19/2022   Summary    Left ventricular size is mildly dilated with normal left ventricular wall    thickness. Left ventricular systolic function is severely reduced with ejection    fraction estimated at 20-25 %. Severe global hypokinesis is present. Grade III diastolic dysfunction with elevated filling pressure. The right ventricle is mildly enlarged with reduced function. Severe bi-atrial dilation.     Mitral valve leaflets appear mildly thickened with restricted posterior    leaflet mobility. Severe mitral regurgitation. Mild aortic regurgitation. Mild tricuspid regurgitation. Systolic pulmonary artery pressure (SPAP) estimated at 54 mmHg (RA pressure    8 mmHg), consistent with moderate pulmonary hypertension. Mild pulmonic regurgitation. Compared with the previous study performed 8- [facility], LV    function has further decline, the degree of MR has progressed. Discharge Medications     Medication List        STOP taking these medications      colchicine 0.6 MG capsule  Commonly known as: Mitigare     febuxostat 40 MG Tabs tablet  Commonly known as: Uloric     furosemide 40 MG tablet  Commonly known as: LASIX                Discharged in fair condition to Inpatient Hospice Unit     Follow Up: Follow up with physician at [facility] facility        Total time today 40 minutes.  > 50 % time dominated by coordination of care and D/W family       Ronda Nunez MD

## 2022-08-23 LAB — AFP: 1 UG/L

## 2022-08-28 PROBLEM — I42.9 CARDIOMYOPATHY (HCC): Status: ACTIVE | Noted: 2022-08-28

## 2022-08-28 PROBLEM — R62.7 FAILURE TO THRIVE IN ADULT: Status: ACTIVE | Noted: 2022-08-28

## (undated) DEVICE — FORCEP BX STD CAP 240CM RAD JAW 4

## (undated) DEVICE — CANNULA,OXY,ADULT,SUPERSOFT,W/7'TUB,SC: Brand: MEDLINE INDUSTRIES, INC.

## (undated) DEVICE — CONMED SCOPE SAVER BITE BLOCK, 20X27 MM: Brand: SCOPE SAVER

## (undated) DEVICE — YANKAUER,BULB TIP,W/O VENT,RIGID,STERILE: Brand: MEDLINE

## (undated) DEVICE — ENDOSCOPIC KIT 2 12 FT OP4 DE2 GWN SYR